# Patient Record
Sex: MALE | Race: BLACK OR AFRICAN AMERICAN | NOT HISPANIC OR LATINO | Employment: UNEMPLOYED | URBAN - METROPOLITAN AREA
[De-identification: names, ages, dates, MRNs, and addresses within clinical notes are randomized per-mention and may not be internally consistent; named-entity substitution may affect disease eponyms.]

---

## 2019-01-28 ENCOUNTER — HOSPITAL ENCOUNTER (OUTPATIENT)
Dept: INTENSIVE CARE | Facility: HOSPITAL | Age: 59
End: 2019-01-29
Attending: INTERNAL MEDICINE | Admitting: INTERNAL MEDICINE

## 2019-01-28 LAB
ABS NEUT (OLG): 5.17 X10(3)/MCL (ref 2.1–9.2)
ALBUMIN SERPL-MCNC: 3.6 GM/DL (ref 3.4–5)
ALBUMIN/GLOB SERPL: 0.9 RATIO (ref 1.1–2)
ALP SERPL-CCNC: 90 UNIT/L (ref 50–136)
ALT SERPL-CCNC: 16 UNIT/L (ref 12–78)
ANION GAP SERPL CALC-SCNC: 16 MMOL/L
APTT PPP: 24.5 SECOND(S) (ref 24.8–36.9)
AST SERPL-CCNC: 31 UNIT/L (ref 15–37)
BASOPHILS # BLD AUTO: 0 X10(3)/MCL (ref 0–0.2)
BASOPHILS NFR BLD AUTO: 0 %
BILIRUB SERPL-MCNC: 0.2 MG/DL (ref 0.2–1)
BILIRUBIN DIRECT+TOT PNL SERPL-MCNC: 0.1 MG/DL (ref 0–0.5)
BILIRUBIN DIRECT+TOT PNL SERPL-MCNC: 0.1 MG/DL (ref 0–0.8)
BUN SERPL-MCNC: 11 MG/DL (ref 7–18)
BUN SERPL-MCNC: 11 MG/DL (ref 8–26)
CALCIUM SERPL-MCNC: 8.7 MG/DL (ref 8.5–10.1)
CHLORIDE SERPL-SCNC: 103 MMOL/L (ref 98–109)
CHLORIDE SERPL-SCNC: 105 MMOL/L (ref 98–107)
CO2 SERPL-SCNC: 29 MMOL/L (ref 21–32)
CREAT SERPL-MCNC: 0.9 MG/DL (ref 0.6–1.3)
CREAT SERPL-MCNC: 1.12 MG/DL (ref 0.7–1.3)
EOSINOPHIL # BLD AUTO: 0.2 X10(3)/MCL (ref 0–0.9)
EOSINOPHIL NFR BLD AUTO: 2 %
ERYTHROCYTE [DISTWIDTH] IN BLOOD BY AUTOMATED COUNT: 14.5 % (ref 11.5–17)
GLOBULIN SER-MCNC: 3.8 GM/DL (ref 2.4–3.5)
GLUCOSE SERPL-MCNC: 121 MG/DL (ref 70–105)
GLUCOSE SERPL-MCNC: 124 MG/DL (ref 74–106)
HCT VFR BLD AUTO: 40.6 % (ref 42–52)
HCT VFR BLD CALC: 41 % (ref 38–51)
HGB BLD-MCNC: 12.8 GM/DL (ref 14–18)
HGB BLD-MCNC: 13.9 MG/DL (ref 12–17)
INR PPP: 1 (ref 0–1.3)
LYMPHOCYTES # BLD AUTO: 1.6 X10(3)/MCL (ref 0.6–4.6)
LYMPHOCYTES NFR BLD AUTO: 21 %
MCH RBC QN AUTO: 27.7 PG (ref 27–31)
MCHC RBC AUTO-ENTMCNC: 31.5 GM/DL (ref 33–36)
MCV RBC AUTO: 87.9 FL (ref 80–94)
MONOCYTES # BLD AUTO: 0.8 X10(3)/MCL (ref 0.1–1.3)
MONOCYTES NFR BLD AUTO: 11 %
NEUTROPHILS # BLD AUTO: 5.17 X10(3)/MCL (ref 2.1–9.2)
NEUTROPHILS NFR BLD AUTO: 66 %
PLATELET # BLD AUTO: 220 X10(3)/MCL (ref 130–400)
PMV BLD AUTO: 9.4 FL (ref 9.4–12.4)
POC IONIZED CALCIUM: 1.08 MMOL/L (ref 1.12–1.32)
POC TCO2: 28 MMOL/L (ref 24–29)
POC TROPONIN: 0.01 NG/ML (ref 0–0.08)
POC TROPONIN: 0.04 NG/ML (ref 0–0.08)
POTASSIUM BLD-SCNC: 3.4 MMOL/L (ref 3.5–4.9)
POTASSIUM SERPL-SCNC: 3.4 MMOL/L (ref 3.5–5.1)
PROT SERPL-MCNC: 7.4 GM/DL (ref 6.4–8.2)
PROTHROMBIN TIME: 13.3 SECOND(S) (ref 12.2–14.7)
RBC # BLD AUTO: 4.62 X10(6)/MCL (ref 4.7–6.1)
SODIUM BLD-SCNC: 142 MMOL/L (ref 138–146)
SODIUM SERPL-SCNC: 139 MMOL/L (ref 136–145)
WBC # SPEC AUTO: 7.9 X10(3)/MCL (ref 4.5–11.5)

## 2020-02-12 LAB — HEMOCCULT STL QL IA: POSITIVE

## 2020-03-31 ENCOUNTER — HISTORICAL (OUTPATIENT)
Dept: ADMINISTRATIVE | Facility: HOSPITAL | Age: 60
End: 2020-03-31

## 2020-03-31 LAB
ALBUMIN SERPL-MCNC: 4 G/DL (ref 3.8–4.8)
ALBUMIN/GLOB SERPL: 1.3 {RATIO} (ref 1.2–2.2)
ALP SERPL-CCNC: 123 IU/L (ref 39–117)
ALT SERPL-CCNC: 9 IU/L (ref 0–44)
AST SERPL-CCNC: 11 IU/L (ref 0–40)
BASOPHILS # BLD AUTO: 0.1 X10E3/UL (ref 0–0.2)
BASOPHILS NFR BLD AUTO: 1 %
BILIRUB SERPL-MCNC: 0.2 MG/DL (ref 0–1.2)
BILIRUB SERPL-MCNC: NEGATIVE MG/DL
BLOOD URINE, POC: NEGATIVE
BUN SERPL-MCNC: 7 MG/DL (ref 8–27)
CALCIUM SERPL-MCNC: 9.3 MG/DL (ref 8.6–10.2)
CHLORIDE SERPL-SCNC: 105 MMOL/L (ref 96–106)
CHOLEST SERPL-MCNC: 118 MG/DL (ref 100–199)
CHOLEST/HDLC SERPL: 2.6 RATIO (ref 0–5)
CLARITY, POC UA: CLEAR
CO2 SERPL-SCNC: 24 MMOL/L (ref 20–29)
COLOR, POC UA: YELLOW
CREAT SERPL-MCNC: 1.24 MG/DL (ref 0.76–1.27)
DEPRECATED CALCIDIOL+CALCIFEROL SERPL-MC: 10.3 NG/ML (ref 30–100)
EOSINOPHIL # BLD AUTO: 0.1 X10E3/UL (ref 0–0.4)
EOSINOPHIL NFR BLD AUTO: 1 %
ERYTHROCYTE [DISTWIDTH] IN BLOOD BY AUTOMATED COUNT: 18.3 % (ref 11.6–15.4)
GLOBULIN SER-MCNC: 3 G/DL (ref 1.5–4.5)
GLUCOSE UR QL STRIP: NEGATIVE
HCT VFR BLD AUTO: 41.2 % (ref 37.5–51)
HDLC SERPL-MCNC: 45 MG/DL
HGB BLD-MCNC: 12.2 G/DL (ref 13–17.7)
KETONES UR QL STRIP: NEGATIVE
LDLC SERPL CALC-MCNC: 58 MG/DL (ref 0–99)
LEUKOCYTE EST, POC UA: NEGATIVE
LYMPHOCYTES # BLD AUTO: 1.4 X10E3/UL (ref 0.7–3.1)
LYMPHOCYTES NFR BLD AUTO: 17 %
MCH RBC QN AUTO: 24.3 PG (ref 26.6–33)
MCHC RBC AUTO-ENTMCNC: 29.6 G/DL (ref 31.5–35.7)
MCV RBC AUTO: 82 FL (ref 79–97)
MONOCYTES # BLD AUTO: 0.5 X10E3/UL (ref 0.1–0.9)
MONOCYTES NFR BLD AUTO: 6 %
NEUTROPHILS # BLD AUTO: 6.5 X10E3/UL (ref 1.4–7)
NEUTROPHILS NFR BLD AUTO: 75 %
NITRITE, POC UA: NEGATIVE
PH, POC UA: 6
PLATELET # BLD AUTO: 322 X10E3/UL (ref 150–450)
POTASSIUM SERPL-SCNC: 3.9 MMOL/L (ref 3.5–5.2)
PROT SERPL-MCNC: 7 G/DL (ref 6–8.5)
PROTEIN, POC: NEGATIVE
RBC # BLD AUTO: 5.02 X10(6)/MCL (ref 4.14–5.8)
SODIUM SERPL-SCNC: 144 MMOL/L (ref 134–144)
SPECIFIC GRAVITY, POC UA: NORMAL
TRIGL SERPL-MCNC: 77 MG/DL (ref 0–149)
UROBILINOGEN, POC UA: NORMAL
VIT B12 SERPL-MCNC: 624 PG/ML (ref 232–1245)
VLDLC SERPL CALC-MCNC: 15 MG/DL (ref 5–40)
WBC # SPEC AUTO: 8.6 X10E3/UL (ref 3.4–10.8)

## 2020-09-08 ENCOUNTER — HISTORICAL (OUTPATIENT)
Dept: ADMINISTRATIVE | Facility: HOSPITAL | Age: 60
End: 2020-09-08

## 2020-09-08 LAB
ABS NEUT (OLG): 7.2 X10(3)/MCL (ref 2.1–9.2)
ALBUMIN SERPL-MCNC: 3.9 GM/DL (ref 3.4–5)
ALBUMIN/GLOB SERPL: 0.8 RATIO (ref 1.1–2)
ALP SERPL-CCNC: 112 UNIT/L (ref 45–117)
ALT SERPL-CCNC: 34 UNIT/L (ref 12–78)
AMYLASE SERPL-CCNC: 79 UNIT/L (ref 25–115)
APPEARANCE, UA: CLEAR
AST SERPL-CCNC: 26 UNIT/L (ref 15–37)
BACTERIA #/AREA URNS AUTO: ABNORMAL /HPF
BASOPHILS # BLD AUTO: 0.1 X10(3)/MCL (ref 0–0.2)
BASOPHILS NFR BLD AUTO: 1 %
BILIRUB SERPL-MCNC: 0.3 MG/DL (ref 0.2–1)
BILIRUB SERPL-MCNC: NEGATIVE MG/DL
BILIRUB UR QL STRIP: NEGATIVE
BILIRUBIN DIRECT+TOT PNL SERPL-MCNC: 0.1 MG/DL (ref 0–0.2)
BILIRUBIN DIRECT+TOT PNL SERPL-MCNC: 0.2 MG/DL
BLOOD URINE, POC: NORMAL
BUN SERPL-MCNC: 8 MG/DL (ref 7–18)
CALCIUM SERPL-MCNC: 9.1 MG/DL (ref 8.5–10.1)
CHLORIDE SERPL-SCNC: 104 MMOL/L (ref 98–107)
CLARITY, POC UA: CLEAR
CO2 SERPL-SCNC: 23 MMOL/L (ref 21–32)
COLOR UR: NORMAL
COLOR, POC UA: YELLOW
CREAT SERPL-MCNC: 1.2 MG/DL (ref 0.6–1.3)
EOSINOPHIL # BLD AUTO: 0 X10(3)/MCL (ref 0–0.9)
EOSINOPHIL NFR BLD AUTO: 0 %
ERYTHROCYTE [DISTWIDTH] IN BLOOD BY AUTOMATED COUNT: 14.6 % (ref 11.5–14.5)
GLOBULIN SER-MCNC: 4.6 GM/ML (ref 2.3–3.5)
GLUCOSE (UA): NEGATIVE
GLUCOSE SERPL-MCNC: 111 MG/DL (ref 74–106)
GLUCOSE UR QL STRIP: NEGATIVE
HCT VFR BLD AUTO: 44.7 % (ref 40–51)
HGB BLD-MCNC: 14.6 GM/DL (ref 13.5–17.5)
HGB UR QL STRIP: NEGATIVE
HYALINE CASTS #/AREA URNS LPF: ABNORMAL /LPF
IMM GRANULOCYTES # BLD AUTO: 0.02 10*3/UL
IMM GRANULOCYTES NFR BLD AUTO: 0 %
KETONES UR QL STRIP: NEGATIVE
KETONES UR QL STRIP: NEGATIVE
LEUKOCYTE EST, POC UA: NEGATIVE
LEUKOCYTE ESTERASE UR QL STRIP: NEGATIVE
LYMPHOCYTES # BLD AUTO: 1.6 X10(3)/MCL (ref 0.6–4.6)
LYMPHOCYTES NFR BLD AUTO: 17 %
MCH RBC QN AUTO: 28.4 PG (ref 26–34)
MCHC RBC AUTO-ENTMCNC: 32.7 GM/DL (ref 31–37)
MCV RBC AUTO: 87 FL (ref 80–100)
MONOCYTES # BLD AUTO: 0.8 X10(3)/MCL (ref 0.1–1.3)
MONOCYTES NFR BLD AUTO: 8 %
NEUTROPHILS # BLD AUTO: 7.2 X10(3)/MCL (ref 2.1–9.2)
NEUTROPHILS NFR BLD AUTO: 74 %
NITRITE UR QL STRIP: NEGATIVE
NITRITE, POC UA: NEGATIVE
PH UR STRIP: 5.5 [PH] (ref 4.5–8)
PH, POC UA: 6
PLATELET # BLD AUTO: 262 X10(3)/MCL (ref 130–400)
PMV BLD AUTO: 9.5 FL (ref 7.4–10.4)
POTASSIUM SERPL-SCNC: 3.4 MMOL/L (ref 3.5–5.1)
PROT SERPL-MCNC: 8.5 GM/DL (ref 6.4–8.2)
PROT UR QL STRIP: NEGATIVE
PROTEIN, POC: NEGATIVE
RBC # BLD AUTO: 5.14 X10(6)/MCL (ref 4.5–5.9)
RBC #/AREA URNS AUTO: ABNORMAL /HPF
SODIUM SERPL-SCNC: 135 MMOL/L (ref 136–145)
SP GR UR STRIP: 1.01 (ref 1–1.03)
SPECIFIC GRAVITY, POC UA: 1.01
SQUAMOUS #/AREA URNS LPF: ABNORMAL /LPF
UROBILINOGEN UR STRIP-ACNC: NORMAL
UROBILINOGEN, POC UA: NORMAL
WBC # SPEC AUTO: 9.7 X10(3)/MCL (ref 4.5–11)
WBC #/AREA URNS AUTO: ABNORMAL /HPF

## 2020-09-10 LAB — FINAL CULTURE: NO GROWTH

## 2020-11-06 ENCOUNTER — HISTORICAL (OUTPATIENT)
Dept: ADMINISTRATIVE | Facility: HOSPITAL | Age: 60
End: 2020-11-06

## 2020-11-06 LAB
ALBUMIN SERPL-MCNC: 3.9 G/DL (ref 3.8–4.8)
ALBUMIN/GLOB SERPL: 1.4 {RATIO} (ref 1.2–2.2)
ALP SERPL-CCNC: 98 IU/L (ref 39–117)
ALT SERPL-CCNC: 20 IU/L (ref 0–44)
AST SERPL-CCNC: 19 IU/L (ref 0–40)
BASOPHILS # BLD AUTO: 0 X10E3/UL (ref 0–0.2)
BASOPHILS NFR BLD AUTO: 0 %
BILIRUB SERPL-MCNC: 0.3 MG/DL (ref 0–1.2)
BUN SERPL-MCNC: 11 MG/DL (ref 8–27)
CALCIUM SERPL-MCNC: 9.3 MG/DL (ref 8.6–10.2)
CHLORIDE SERPL-SCNC: 101 MMOL/L (ref 96–106)
CHOLEST SERPL-MCNC: 116 MG/DL (ref 100–199)
CHOLEST/HDLC SERPL: 2.6 RATIO (ref 0–5)
CO2 SERPL-SCNC: 25 MMOL/L (ref 20–29)
CREAT SERPL-MCNC: 1.48 MG/DL (ref 0.76–1.27)
CREAT/UREA NIT SERPL: 7 (ref 10–24)
DEPRECATED CALCIDIOL+CALCIFEROL SERPL-MC: 36.4 NG/ML (ref 30–100)
EOSINOPHIL # BLD AUTO: 0.3 X10E3/UL (ref 0–0.4)
EOSINOPHIL NFR BLD AUTO: 4 %
ERYTHROCYTE [DISTWIDTH] IN BLOOD BY AUTOMATED COUNT: 14.9 % (ref 11.6–15.4)
FERRITIN SERPL-MCNC: 86 NG/ML (ref 30–400)
FOLATE SERPL-MCNC: 3.6 NG/ML
GLOBULIN SER-MCNC: 2.7 G/DL (ref 1.5–4.5)
GLUCOSE SERPL-MCNC: 102 MG/DL (ref 65–99)
HCT VFR BLD AUTO: 46.3 % (ref 37.5–51)
HDLC SERPL-MCNC: 45 MG/DL
HGB BLD-MCNC: 13.9 G/DL (ref 13–17.7)
IRON SATN MFR SERPL: 24 % (ref 15–55)
IRON SERPL-MCNC: 76 MCG/DL (ref 38–169)
LDLC SERPL CALC-MCNC: 55 MG/DL (ref 0–99)
LYMPHOCYTES # BLD AUTO: 1.8 X10E3/UL (ref 0.7–3.1)
LYMPHOCYTES NFR BLD AUTO: 24 %
MCH RBC QN AUTO: 28.1 PG (ref 26.6–33)
MCHC RBC AUTO-ENTMCNC: 30 G/DL (ref 31.5–35.7)
MCV RBC AUTO: 94 FL (ref 79–97)
MONOCYTES # BLD AUTO: 0.6 X10E3/UL (ref 0.1–0.9)
MONOCYTES NFR BLD AUTO: 8 %
NEUTROPHILS # BLD AUTO: 4.7 X10E3/UL (ref 1.4–7)
NEUTROPHILS NFR BLD AUTO: 64 %
PLATELET # BLD AUTO: 228 X10E3/UL (ref 150–450)
POTASSIUM SERPL-SCNC: 3.7 MMOL/L (ref 3.5–5.2)
PROT SERPL-MCNC: 6.6 G/DL (ref 6–8.5)
RBC # BLD AUTO: 4.95 X10(6)/MCL (ref 4.14–5.8)
SODIUM SERPL-SCNC: 141 MMOL/L (ref 134–144)
TIBC SERPL-MCNC: 318 MCG/DL (ref 250–450)
TRIGL SERPL-MCNC: 82 MG/DL (ref 0–149)
TSH SERPL-ACNC: 1.29 MIU/ML (ref 0.45–4.5)
VLDLC SERPL CALC-MCNC: 16 MG/DL (ref 5–40)
WBC # SPEC AUTO: 7.5 X10E3/UL (ref 3.4–10.8)

## 2021-02-01 ENCOUNTER — HISTORICAL (OUTPATIENT)
Dept: ADMINISTRATIVE | Facility: HOSPITAL | Age: 61
End: 2021-02-01

## 2021-02-01 LAB
ABS NEUT (OLG): 6.19 X10(3)/MCL (ref 2.1–9.2)
ALBUMIN SERPL-MCNC: 3.8 GM/DL (ref 3.4–4.8)
ALBUMIN/GLOB SERPL: 0.9 RATIO (ref 1.1–2)
ALP SERPL-CCNC: 105 UNIT/L (ref 40–150)
ALT SERPL-CCNC: 14 UNIT/L (ref 0–55)
APPEARANCE, UA: CLEAR
AST SERPL-CCNC: 13 UNIT/L (ref 5–34)
BACTERIA #/AREA URNS AUTO: ABNORMAL /HPF
BASOPHILS # BLD AUTO: 0 X10(3)/MCL (ref 0–0.2)
BASOPHILS NFR BLD AUTO: 1 %
BILIRUB SERPL-MCNC: 0.4 MG/DL
BILIRUB UR QL STRIP: NEGATIVE
BILIRUBIN DIRECT+TOT PNL SERPL-MCNC: 0.2 MG/DL (ref 0–0.5)
BILIRUBIN DIRECT+TOT PNL SERPL-MCNC: 0.2 MG/DL (ref 0–0.8)
BNP BLD-MCNC: <10 PG/ML
BUN SERPL-MCNC: 8 MG/DL (ref 8.4–25.7)
CALCIUM SERPL-MCNC: 9.5 MG/DL (ref 8.8–10)
CHLORIDE SERPL-SCNC: 101 MMOL/L (ref 98–107)
CHOLEST SERPL-MCNC: 135 MG/DL
CHOLEST/HDLC SERPL: 3 {RATIO} (ref 0–5)
CO2 SERPL-SCNC: 30 MMOL/L (ref 23–31)
COLOR UR: YELLOW
CREAT SERPL-MCNC: 1.12 MG/DL (ref 0.73–1.18)
CREAT UR-MCNC: 169.2 MG/DL (ref 58–161)
DEPRECATED CALCIDIOL+CALCIFEROL SERPL-MC: 42.9 NG/ML (ref 30–80)
EOSINOPHIL # BLD AUTO: 0.1 X10(3)/MCL (ref 0–0.9)
EOSINOPHIL NFR BLD AUTO: 2 %
ERYTHROCYTE [DISTWIDTH] IN BLOOD BY AUTOMATED COUNT: 14.5 % (ref 11.5–14.5)
GLOBULIN SER-MCNC: 4.2 GM/DL (ref 2.4–3.5)
GLUCOSE (UA): NEGATIVE
GLUCOSE SERPL-MCNC: 106 MG/DL (ref 82–115)
HCT VFR BLD AUTO: 46.1 % (ref 40–51)
HDLC SERPL-MCNC: 42 MG/DL (ref 35–60)
HGB BLD-MCNC: 14.6 GM/DL (ref 13.5–17.5)
HGB UR QL STRIP: NEGATIVE
HYALINE CASTS #/AREA URNS LPF: ABNORMAL /LPF
IMM GRANULOCYTES # BLD AUTO: 0.02 10*3/UL
IMM GRANULOCYTES NFR BLD AUTO: 0 %
KETONES UR QL STRIP: NEGATIVE
LDLC SERPL CALC-MCNC: 72 MG/DL (ref 50–140)
LEUKOCYTE ESTERASE UR QL STRIP: NEGATIVE
LYMPHOCYTES # BLD AUTO: 1.5 X10(3)/MCL (ref 0.6–4.6)
LYMPHOCYTES NFR BLD AUTO: 17 %
MCH RBC QN AUTO: 28.9 PG (ref 26–34)
MCHC RBC AUTO-ENTMCNC: 31.7 GM/DL (ref 31–37)
MCV RBC AUTO: 91.1 FL (ref 80–100)
MICROALBUMIN UR-MCNC: 9 UG/ML
MICROALBUMIN/CREAT RATIO PNL UR: 5.3 MG/GM CR (ref 0–30)
MONOCYTES # BLD AUTO: 0.8 X10(3)/MCL (ref 0.1–1.3)
MONOCYTES NFR BLD AUTO: 10 %
NEUTROPHILS # BLD AUTO: 6.19 X10(3)/MCL (ref 2.1–9.2)
NEUTROPHILS NFR BLD AUTO: 71 %
NITRITE UR QL STRIP: NEGATIVE
PH UR STRIP: 7 [PH] (ref 4.5–8)
PLATELET # BLD AUTO: 258 X10(3)/MCL (ref 130–400)
PMV BLD AUTO: 9.7 FL (ref 7.4–10.4)
POTASSIUM SERPL-SCNC: 3.9 MMOL/L (ref 3.5–5.1)
PROT SERPL-MCNC: 8 GM/DL (ref 5.8–7.6)
PROT UR QL STRIP: NEGATIVE
RBC # BLD AUTO: 5.06 X10(6)/MCL (ref 4.5–5.9)
RBC #/AREA URNS AUTO: ABNORMAL /HPF
SODIUM SERPL-SCNC: 141 MMOL/L (ref 136–145)
SP GR UR STRIP: 1.02 (ref 1–1.03)
SQUAMOUS #/AREA URNS LPF: ABNORMAL /LPF
T4 FREE SERPL-MCNC: 0.84 NG/DL (ref 0.7–1.48)
TRIGL SERPL-MCNC: 106 MG/DL (ref 34–140)
TSH SERPL-ACNC: 1.69 UIU/ML (ref 0.35–4.94)
UROBILINOGEN UR STRIP-ACNC: NORMAL
VIT B12 SERPL-MCNC: 1072 PG/ML (ref 213–816)
VLDLC SERPL CALC-MCNC: 21 MG/DL
WBC # SPEC AUTO: 8.7 X10(3)/MCL (ref 4.5–11)
WBC #/AREA URNS AUTO: ABNORMAL /HPF

## 2021-02-07 LAB
LEFT EYE DM RETINOPATHY: NEGATIVE
RIGHT EYE DM RETINOPATHY: NEGATIVE

## 2021-03-25 ENCOUNTER — HOSPITAL ENCOUNTER (OUTPATIENT)
Dept: MEDSURG UNIT | Facility: HOSPITAL | Age: 61
End: 2021-03-27
Attending: INTERNAL MEDICINE | Admitting: INTERNAL MEDICINE

## 2021-03-25 ENCOUNTER — HOSPITAL ENCOUNTER (OUTPATIENT)
Dept: TELEMEDICINE | Facility: HOSPITAL | Age: 61
Discharge: HOME | End: 2021-03-25
Payer: COMMERCIAL

## 2021-03-25 DIAGNOSIS — R56.9 SEIZURES: ICD-10-CM

## 2021-03-25 LAB
ABS NEUT (OLG): 6.05 X10(3)/MCL (ref 2.1–9.2)
AMPHET UR QL SCN: NEGATIVE
AMPHET UR QL SCN: NEGATIVE
ANION GAP SERPL CALC-SCNC: 12 MMOL/L
APPEARANCE, UA: CLEAR
APTT PPP: 26.4 SECOND(S) (ref 23.3–37)
BACTERIA #/AREA URNS AUTO: ABNORMAL /HPF
BARBITURATE SCN PRESENT UR: NEGATIVE
BARBITURATE SCN PRESENT UR: NEGATIVE
BASOPHILS # BLD AUTO: 0 X10(3)/MCL (ref 0–0.2)
BASOPHILS NFR BLD AUTO: 0 %
BENZODIAZ UR QL SCN: NEGATIVE
BENZODIAZ UR QL SCN: NEGATIVE
BILIRUB UR QL STRIP: NEGATIVE
BUN SERPL-MCNC: 6 MG/DL (ref 8–26)
CANNABINOIDS UR QL SCN: NEGATIVE
CANNABINOIDS UR QL SCN: NEGATIVE
CHLORIDE SERPL-SCNC: 96 MMOL/L (ref 98–109)
COCAINE UR QL SCN: NEGATIVE
COCAINE UR QL SCN: NEGATIVE
COLOR UR: ABNORMAL
CREAT SERPL-MCNC: 1.2 MG/DL (ref 0.6–1.3)
EOSINOPHIL # BLD AUTO: 0.2 X10(3)/MCL (ref 0–0.9)
EOSINOPHIL NFR BLD AUTO: 2 %
ERYTHROCYTE [DISTWIDTH] IN BLOOD BY AUTOMATED COUNT: 13.6 % (ref 11.5–14.5)
GLUCOSE (UA): NEGATIVE
GLUCOSE SERPL-MCNC: 91 MG/DL (ref 70–105)
HCT VFR BLD AUTO: 41.1 % (ref 40–51)
HCT VFR BLD CALC: 42 % (ref 38–51)
HGB BLD-MCNC: 13.1 GM/DL (ref 13.5–17.5)
HGB BLD-MCNC: 14.3 MG/DL (ref 12–17)
HGB UR QL STRIP: NEGATIVE
HYALINE CASTS #/AREA URNS LPF: ABNORMAL /LPF
IMM GRANULOCYTES # BLD AUTO: 0.03 10*3/UL
IMM GRANULOCYTES NFR BLD AUTO: 0 %
KETONES UR QL STRIP: NEGATIVE
LEUKOCYTE ESTERASE UR QL STRIP: NEGATIVE
LYMPHOCYTES # BLD AUTO: 1.5 X10(3)/MCL (ref 0.6–4.6)
LYMPHOCYTES NFR BLD AUTO: 18 %
MCH RBC QN AUTO: 29 PG (ref 26–34)
MCHC RBC AUTO-ENTMCNC: 31.9 GM/DL (ref 31–37)
MCV RBC AUTO: 90.9 FL (ref 80–100)
MONOCYTES # BLD AUTO: 0.7 X10(3)/MCL (ref 0.1–1.3)
MONOCYTES NFR BLD AUTO: 8 %
NEUTROPHILS # BLD AUTO: 6.05 X10(3)/MCL (ref 2.1–9.2)
NEUTROPHILS NFR BLD AUTO: 71 %
NITRITE UR QL STRIP: NEGATIVE
OPIATES UR QL SCN: NEGATIVE
OPIATES UR QL SCN: NEGATIVE
PCP UR QL: NEGATIVE
PCP UR QL: NEGATIVE
PH UR STRIP.AUTO: 6.5 [PH] (ref 3–11)
PH UR STRIP.AUTO: 6.5 [PH] (ref 3–11)
PH UR STRIP: 7 [PH] (ref 4.5–8)
PLATELET # BLD AUTO: 228 X10(3)/MCL (ref 130–400)
PMV BLD AUTO: 9.5 FL (ref 7.4–10.4)
POC IONIZED CALCIUM: 1.11 MMOL/L (ref 1.12–1.32)
POC TCO2: 34 MMOL/L (ref 24–29)
POC TROPONIN: 0 NG/ML (ref 0–0.08)
POTASSIUM BLD-SCNC: 3.4 MMOL/L (ref 3.5–4.9)
PROT UR QL STRIP: 20 MG/DL
RBC # BLD AUTO: 4.52 X10(6)/MCL (ref 4.5–5.9)
RBC #/AREA URNS AUTO: ABNORMAL /HPF
SARS-COV-2 AG RESP QL IA.RAPID: NEGATIVE
SODIUM BLD-SCNC: 138 MMOL/L (ref 138–146)
SP GR UR STRIP: 1.01 (ref 1–1.03)
SQUAMOUS #/AREA URNS LPF: ABNORMAL /LPF
TROPONIN I SERPL-MCNC: <0.01 NG/ML (ref 0–0.04)
UROBILINOGEN UR STRIP-ACNC: NORMAL
WBC # SPEC AUTO: 8.6 X10(3)/MCL (ref 4.5–11)
WBC #/AREA URNS AUTO: ABNORMAL /HPF

## 2021-03-25 PROCEDURE — G0427 INPT/ED TELECONSULT70: HCPCS | Mod: GT,,, | Performed by: PSYCHIATRY & NEUROLOGY

## 2021-03-25 PROCEDURE — G0427 PR INPT TELEHEALTH CON 70/>M: ICD-10-PCS | Mod: GT,,, | Performed by: PSYCHIATRY & NEUROLOGY

## 2021-03-26 LAB
ABS NEUT (OLG): 5.94 X10(3)/MCL (ref 2.1–9.2)
ALBUMIN SERPL-MCNC: 3.2 GM/DL (ref 3.4–4.8)
ALBUMIN/GLOB SERPL: 0.8 RATIO (ref 1.1–2)
ALP SERPL-CCNC: 108 UNIT/L (ref 40–150)
ALT SERPL-CCNC: 9 UNIT/L (ref 0–55)
AST SERPL-CCNC: 11 UNIT/L (ref 5–34)
BASOPHILS # BLD AUTO: 0 X10(3)/MCL (ref 0–0.2)
BASOPHILS NFR BLD AUTO: 0 %
BILIRUB SERPL-MCNC: 0.6 MG/DL
BILIRUBIN DIRECT+TOT PNL SERPL-MCNC: 0.2 MG/DL (ref 0–0.5)
BILIRUBIN DIRECT+TOT PNL SERPL-MCNC: 0.4 MG/DL (ref 0–0.8)
BUN SERPL-MCNC: 8.2 MG/DL (ref 8.4–25.7)
CALCIUM SERPL-MCNC: 8.8 MG/DL (ref 8.8–10)
CHLORIDE SERPL-SCNC: 101 MMOL/L (ref 98–107)
CO2 SERPL-SCNC: 27 MMOL/L (ref 23–31)
CREAT SERPL-MCNC: 1.07 MG/DL (ref 0.73–1.18)
EOSINOPHIL # BLD AUTO: 0.2 X10(3)/MCL (ref 0–0.9)
EOSINOPHIL NFR BLD AUTO: 2 %
ERYTHROCYTE [DISTWIDTH] IN BLOOD BY AUTOMATED COUNT: 13.5 % (ref 11.5–14.5)
GLOBULIN SER-MCNC: 3.8 GM/DL (ref 2.4–3.5)
GLUCOSE SERPL-MCNC: 108 MG/DL (ref 82–115)
HCT VFR BLD AUTO: 43.5 % (ref 40–51)
HGB BLD-MCNC: 13.7 GM/DL (ref 13.5–17.5)
IMM GRANULOCYTES # BLD AUTO: 0.02 10*3/UL
IMM GRANULOCYTES NFR BLD AUTO: 0 %
INR PPP: 1.03 (ref 0.9–1.2)
LYMPHOCYTES # BLD AUTO: 1.1 X10(3)/MCL (ref 0.6–4.6)
LYMPHOCYTES NFR BLD AUTO: 14 %
MCH RBC QN AUTO: 28.4 PG (ref 26–34)
MCHC RBC AUTO-ENTMCNC: 31.5 GM/DL (ref 31–37)
MCV RBC AUTO: 90.1 FL (ref 80–100)
MONOCYTES # BLD AUTO: 0.6 X10(3)/MCL (ref 0.1–1.3)
MONOCYTES NFR BLD AUTO: 8 %
NEUTROPHILS # BLD AUTO: 5.94 X10(3)/MCL (ref 2.1–9.2)
NEUTROPHILS NFR BLD AUTO: 75 %
PLATELET # BLD AUTO: 211 X10(3)/MCL (ref 130–400)
PMV BLD AUTO: 9.4 FL (ref 7.4–10.4)
POTASSIUM SERPL-SCNC: 3.8 MMOL/L (ref 3.5–5.1)
PROT SERPL-MCNC: 7 GM/DL (ref 5.8–7.6)
PROTHROMBIN TIME: 13.1 SECOND(S) (ref 11.9–14.4)
RBC # BLD AUTO: 4.83 X10(6)/MCL (ref 4.5–5.9)
SODIUM SERPL-SCNC: 139 MMOL/L (ref 136–145)
WBC # SPEC AUTO: 7.9 X10(3)/MCL (ref 4.5–11)

## 2021-03-27 LAB
ABS NEUT (OLG): 4.89 X10(3)/MCL (ref 2.1–9.2)
ALBUMIN SERPL-MCNC: 3 GM/DL (ref 3.4–4.8)
ALBUMIN/GLOB SERPL: 0.8 RATIO (ref 1.1–2)
ALP SERPL-CCNC: 98 UNIT/L (ref 40–150)
ALT SERPL-CCNC: 11 UNIT/L (ref 0–55)
AST SERPL-CCNC: 11 UNIT/L (ref 5–34)
BASOPHILS # BLD AUTO: 0 X10(3)/MCL (ref 0–0.2)
BASOPHILS NFR BLD AUTO: 0 %
BILIRUB SERPL-MCNC: 0.5 MG/DL
BILIRUBIN DIRECT+TOT PNL SERPL-MCNC: 0.2 MG/DL (ref 0–0.5)
BILIRUBIN DIRECT+TOT PNL SERPL-MCNC: 0.3 MG/DL (ref 0–0.8)
BUN SERPL-MCNC: 6.7 MG/DL (ref 8.4–25.7)
CALCIUM SERPL-MCNC: 8.6 MG/DL (ref 8.8–10)
CHLORIDE SERPL-SCNC: 101 MMOL/L (ref 98–107)
CO2 SERPL-SCNC: 31 MMOL/L (ref 23–31)
CREAT SERPL-MCNC: 1.04 MG/DL (ref 0.73–1.18)
EOSINOPHIL # BLD AUTO: 0.2 X10(3)/MCL (ref 0–0.9)
EOSINOPHIL NFR BLD AUTO: 3 %
ERYTHROCYTE [DISTWIDTH] IN BLOOD BY AUTOMATED COUNT: 13.3 % (ref 11.5–14.5)
GLOBULIN SER-MCNC: 3.7 GM/DL (ref 2.4–3.5)
GLUCOSE SERPL-MCNC: 112 MG/DL (ref 82–115)
HCT VFR BLD AUTO: 43.5 % (ref 40–51)
HGB BLD-MCNC: 13.7 GM/DL (ref 13.5–17.5)
IMM GRANULOCYTES # BLD AUTO: 0.02 10*3/UL
IMM GRANULOCYTES NFR BLD AUTO: 0 %
INR PPP: 1.11 (ref 0.9–1.2)
LYMPHOCYTES # BLD AUTO: 1.3 X10(3)/MCL (ref 0.6–4.6)
LYMPHOCYTES NFR BLD AUTO: 19 %
MCH RBC QN AUTO: 28.4 PG (ref 26–34)
MCHC RBC AUTO-ENTMCNC: 31.5 GM/DL (ref 31–37)
MCV RBC AUTO: 90.1 FL (ref 80–100)
MONOCYTES # BLD AUTO: 0.6 X10(3)/MCL (ref 0.1–1.3)
MONOCYTES NFR BLD AUTO: 9 %
NEUTROPHILS # BLD AUTO: 4.89 X10(3)/MCL (ref 2.1–9.2)
NEUTROPHILS NFR BLD AUTO: 69 %
PLATELET # BLD AUTO: 213 X10(3)/MCL (ref 130–400)
PMV BLD AUTO: 9.4 FL (ref 7.4–10.4)
POTASSIUM SERPL-SCNC: 3.5 MMOL/L (ref 3.5–5.1)
PROT SERPL-MCNC: 6.7 GM/DL (ref 5.8–7.6)
PROTHROMBIN TIME: 13.9 SECOND(S) (ref 11.9–14.4)
RBC # BLD AUTO: 4.83 X10(6)/MCL (ref 4.5–5.9)
SODIUM SERPL-SCNC: 140 MMOL/L (ref 136–145)
WBC # SPEC AUTO: 7.1 X10(3)/MCL (ref 4.5–11)

## 2021-03-29 PROBLEM — R56.9 SEIZURES: Status: ACTIVE | Noted: 2021-03-29

## 2021-05-14 ENCOUNTER — HISTORICAL (OUTPATIENT)
Dept: ADMINISTRATIVE | Facility: HOSPITAL | Age: 61
End: 2021-05-14

## 2021-05-14 LAB
ABS NEUT (OLG): 5.33 X10(3)/MCL (ref 2.1–9.2)
ALBUMIN SERPL-MCNC: 3.8 GM/DL (ref 3.4–4.8)
ALBUMIN/GLOB SERPL: 0.9 RATIO (ref 1.1–2)
ALP SERPL-CCNC: 107 UNIT/L (ref 40–150)
ALT SERPL-CCNC: 18 UNIT/L (ref 0–55)
AST SERPL-CCNC: 15 UNIT/L (ref 5–34)
BASOPHILS # BLD AUTO: 0 X10(3)/MCL (ref 0–0.2)
BASOPHILS NFR BLD AUTO: 1 %
BILIRUB SERPL-MCNC: 0.4 MG/DL
BILIRUBIN DIRECT+TOT PNL SERPL-MCNC: 0.2 MG/DL (ref 0–0.5)
BILIRUBIN DIRECT+TOT PNL SERPL-MCNC: 0.2 MG/DL (ref 0–0.8)
BNP BLD-MCNC: 15.1 PG/ML
BUN SERPL-MCNC: 10.7 MG/DL (ref 8.4–25.7)
CALCIUM SERPL-MCNC: 9.7 MG/DL (ref 8.8–10)
CHLORIDE SERPL-SCNC: 99 MMOL/L (ref 98–107)
CO2 SERPL-SCNC: 29 MMOL/L (ref 23–31)
CREAT SERPL-MCNC: 1.47 MG/DL (ref 0.73–1.18)
EOSINOPHIL # BLD AUTO: 0.2 X10(3)/MCL (ref 0–0.9)
EOSINOPHIL NFR BLD AUTO: 2 %
ERYTHROCYTE [DISTWIDTH] IN BLOOD BY AUTOMATED COUNT: 14.2 % (ref 11.5–14.5)
EST. AVERAGE GLUCOSE BLD GHB EST-MCNC: 125.5 MG/DL
GLOBULIN SER-MCNC: 4.3 GM/DL (ref 2.4–3.5)
GLUCOSE SERPL-MCNC: 101 MG/DL (ref 82–115)
HBA1C MFR BLD: 6 %
HCT VFR BLD AUTO: 46.8 % (ref 40–51)
HGB BLD-MCNC: 14.7 GM/DL (ref 13.5–17.5)
IMM GRANULOCYTES # BLD AUTO: 0.03 10*3/UL
IMM GRANULOCYTES NFR BLD AUTO: 0 %
LYMPHOCYTES # BLD AUTO: 1.5 X10(3)/MCL (ref 0.6–4.6)
LYMPHOCYTES NFR BLD AUTO: 19 %
MCH RBC QN AUTO: 28.5 PG (ref 26–34)
MCHC RBC AUTO-ENTMCNC: 31.4 GM/DL (ref 31–37)
MCV RBC AUTO: 90.7 FL (ref 80–100)
MONOCYTES # BLD AUTO: 0.6 X10(3)/MCL (ref 0.1–1.3)
MONOCYTES NFR BLD AUTO: 8 %
NEUTROPHILS # BLD AUTO: 5.33 X10(3)/MCL (ref 2.1–9.2)
NEUTROPHILS NFR BLD AUTO: 70 %
PLATELET # BLD AUTO: 262 X10(3)/MCL (ref 130–400)
PMV BLD AUTO: 9.8 FL (ref 7.4–10.4)
POTASSIUM SERPL-SCNC: 3.9 MMOL/L (ref 3.5–5.1)
PROT SERPL-MCNC: 8.1 GM/DL (ref 5.8–7.6)
RBC # BLD AUTO: 5.16 X10(6)/MCL (ref 4.5–5.9)
SODIUM SERPL-SCNC: 142 MMOL/L (ref 136–145)
WBC # SPEC AUTO: 7.6 X10(3)/MCL (ref 4.5–11)

## 2021-10-04 LAB — EST CREAT CLEARANCE SER (OHS): 63.15 ML/MIN

## 2022-04-27 DIAGNOSIS — R56.9 SEIZURE: Primary | ICD-10-CM

## 2022-04-29 NOTE — ED PROVIDER NOTES
Patient:   Lawrence Osullivan             MRN: [id number]            FIN: [id number]               Age:   58 years     Sex:  Male     :  1960   Associated Diagnoses:   Accelerated hypertension; Chronic pain; Expressive aphasia; Right hemiparesis; Noncompliance with medications; Infestation by bed bug   Author:   Jazz Nicholson MD      Basic Information   Time seen: Date & time 2019 16:24:00, Immediately upon arrival.   History source: EMS.   Arrival mode: Ambulance.   History limitation: Clinical condition, Uncooperative patient.   Additional information: Chief Complaint from Nursing Triage Note : Chief Complaint   2019 16:24 CST      Chief Complaint           c/o AMS onset 3 hours ago. pt has hx of previous CVA. pt lethargic but follows commands. pt mildly tachypneic. code fast called per Bharat EJAN BAPTISTE.   .      History of Present Illness   The patient presents with altered mental status and 59 y/o AAM with a hx of CVA with residual aphasia and right upper and right lower extremity deficits presents to the ED via EMS due to altered mental status onset x3 hours PTA. Family (, patient's brother, Chan) states EMS was initially called for an headache and worsening slurred speech.  Patient's sister read By telephone reports that he has been out of his medications for several days and this happens when he is out of his medications.. Patient began c/o RUE pain at ED..  The onset was 3  hours ago.  The course/duration of symptoms is constant.  The character of symptoms is confused.  The degree at onset was moderate.  The degree at present is moderate.  Baseline status: alert and oriented X 4.   The exacerbating factor is none.  The relieving factor is none.  Risk factors consist of cerebral vascular accident.  Prior episodes: none.  Therapy today: emergency medical services.     PATIENT HAS ALTERNATE MRN [id number] WITH MULTIPLE PRIOR SIMILAR VISITS      Review of Systems             Additional  review of systems information: Unable to obtain due to: Uncooperative patient.      Health Status   Allergies:    Allergic Reactions (Selected)  Severity Not Documented  Aspirin- Unknown.  HydrALAZINE- Unknown.  Iodine- Unknown..   Medications:  (Selected)   Inpatient Medications  Ordered  IVF Normal Saline NS Bolus 250ml 250 mL: 250 mL, 250 mL, IV, 250 mL/hr, start date 01/28/19 16:27:00 CST, bolus dose: 250 mL  IVF Normal Saline NS Infusion 1,000 mL: 1,000 mL, 1,000 mL, IV, 75 mL/hr, start date 01/28/19 16:27:00 CST.      Past Medical/ Family/ Social History   Medical history:   CVA.   Social history:    Social & Psychosocial Habits    Alcohol  07/30/2018  Use: Never    Tobacco  07/30/2018  Use: Current every day smoker    Type: Cigarettes.   Surgical history.   Physical Examination   [place] coma scale:  Eye response: 4 /4, verbal response: 3 /5, motor response: 5 /6, Total score: Total score: 12.    Neurological:  Level of consciousness: Uncooperative, Cognitive function: Will not answer questions, Motor strength: Proximal right upper extremity  4 /5, distal right upper extremity  4 /5, proximal left upper extremity  5 /5, distal left upper extremity  5 /5, right lower extremity  4 /5, left lower extremity  5 /5, Coordination: Finger(s) to nose (will not cooperate with testing), heel(s) of foot to opposite shin (will not cooperate with testing), Speech: Aphasic, slurred, Gait: deferred.    General:  Alert, moderate distress, Wincing and grimacing.    Skin:  Warm, dry, intact, normal for ethnicity.    Head:  Normocephalic, atraumatic.    Neck:  Supple, trachea midline, no JVD.    Eye:  Pupils are equal, round and reactive to light, normal conjunctiva.    Ears, nose, mouth and throat:  Oral mucosa moist.   Cardiovascular:  Regular rate and rhythm, Normal peripheral perfusion.    Respiratory:  Lungs are clear to auscultation, respirations are non-labored.    Gastrointestinal:  Soft, Nontender, Non distended.        Medical Decision Making   Rationale:  Patient presented and activated as a code fast as he had right-sided deficits and a seizure and was markedly hypertensive complaining of a severe headache prior to arrival concerning for possible intercranial hemorrhage.  Head CT demonstrated extensive encephalomalacia on the left related to an old stroke but otherwise no acute abnormalities.  I reviewed the patient's records including records under an alternate MRN.  My documentation below essentially is the same presentation as his current visit.  I do not suspect an acute CVA event in this patient; however, his unregulated hypertension likely leads to a recrudescence of his symptoms.  She demonstrates that the patient has refilled his medications recently that apparently uses a medication delivery service and has not received in the mail.  We discussed the importance of having his refills submitted before his prior prescription runs out and his family verbalized understanding.    Patient will be admitted to Dr. Ledezma for overnight observation to ensure that his symptoms do not worsen and that his blood pressure remains controlled..   Documents reviewed:  Emergency department nurses' notes, emergency department records, prior records, Reviewed from my documentation from a 8/30/2018 encounter with a similar presentation    Time: 8/30/2018 11:45:00 .   Notes: Head CT demonstrated no acute abnormalities.  Patient's symptoms are somewhat improved at this time.  He is able to hold both arms off the bed without drift and bilateral lower extremities hold for 5 seconds about hitting the back though they do drift.  Patient is mostly complaining about headache at this time..   Time: 8/30/2018 11:55:00 .   Notes: Head CT demonstrated no acute intracranial processes, large left-sided stroke noted.  On further discussion with the patient's brother Chan, patient did have some residual speech deficits today as well as right upper and  right lower extremity weakness which is chronic for him.  He does not feel that his speech symptoms are any worse than baseline; however, the patient continues to insist that his speech is somewhat worse.  Patient still complaining of significant headache.  Pain medications administered.    I discussed with Dr. Vergara regarding the patient's presentation, states that if patient's verbal symptoms/expressive aphasia or truly worse than his baseline, this would be an indication for TPA.  I discussed the risks and benefits of TPA with the patient and his brother, they would like to discuss it further before proceeding..   Time: 8/30/2018 12:15:00 .   Notes: Patient's symptoms are improving with headache control.  I discussed with Dr. Vergara as above and offered potential for TPA administration.  Records review demonstrates that he did receive TPA in 2014 for a recrudescence of his symptoms at that time..   Time: 8/30/2018 12:35:00 .   Notes: Patient and his brother Chan are still discussing whether or not to proceed with TPA.  Chan would like to discuss with his sister who is the patient's primary caregiver.   Time: 8/30/2018 12:45:00 .   Notes: I was able to discuss with the patient's sister who is his primary caregiver who advised that the language deficits were seeing today are his baseline since his stroke in 2012.  She further elaborates that he frequently complains of pain in his arms and legs and that they recently stopped all of his pain medications because they felt he was having addiction issues.  We discussed at length that there is a possibility that his symptoms slightly worsened today which may reflect a new stroke or worsening of his prior stroke but after discussion of the potential risks of TPA administration, she states that she does not think his symptoms have worsened enough to accept these risks.  Certainly his uncontrolled blood pressure in the setting of being out of his medications could exacerbate  both his headaches, pain, and cause recrudescence of his stroke symptoms.  Patient will be admitted to his primary care physician's service for hypertensive urgency and headache..       .    Orders  Launch Order Profile (Selected)   Inpatient Orders  Ordered  Admission Assessment Adult: 19 19:08:03 CST, Stop date 19 19:08:03 CST  Admission History Adult: 19 19:08:03 CST, Stop date 19 19:08:03 CST  Admit to Outpatient Observation: 19 18:34:00 CST, Jose Ledezma MD Telemetry with Monitor, No  Basic Admission Information Adult: 19 19:08:03 CST, Stop date 19 19:08:03 CST  Blood Glucose Monitoring POC: 19 16:27:00 CST, Stop date 19 16:27:00 CST, Blood, Stat, 19 16:27:00 CST  Blood Pressure: 19 16:27:00 CST, Stop date 19 16:27:00 CST, monitor blood pressure.  Capacity Management Bed Request: 19 18:34:46 CST, Telemetry with Monitor  Cardiac Monitorin19 16:27:00 CST, Constant Order, place on telemetry monitor.  Discharge Planning Ongoing Assessment: 19 9:00:00 CST, q3day  Fall Risk Protocol: 19 17:28:40 CST, Constant Order  HOB flat.: 19 16:27:00 CST, HOB flat.  IVF Normal Saline NS Infusion 1,000 mL: 1,000 mL, 1,000 mL, IV, 75 mL/hr, start date 19 16:27:00 CST  If hemodynamically unstable (SBP<100  and pulse >110) bolus with 250ml NS and notify MD.: 19 16:27:00 CST, If hemodynamically unstable (SBP<100  and pulse >110) bolus with 250ml NS and notify MD.  If onset of symptoms < 6 hours and NIH Stroke Scale is 6 or greater consult interventional neurolog...: 19 16:27:00 CST, If onset of symptoms < 6 hours and NIH Stroke Scale is 6 or greater consult interventional neurology STAT, if available (see physician on call ER schedule) for possible endovascular therapy.  [facility] - Swallow Screening Tool: 19 16:27:00 CST, Constant order, Prior to oral medication administration.  Meal Supervision: 19  18:45:05 CST  NIH Stroke Scale Assessment: 01/28/19 16:27:00 CST, q1hr, abbreviated NIHSS., 01/28/19 16:30:00 CST  NPO: 01/28/19 16:27:00 CST, CM NPO  Neuro Checks: 01/28/19 18:43:00 CST, q4hr, 01/28/19 19:00:00 CST  Oxygen Therapy: 01/28/19 16:27:00 CST, Stat, Nasal Cannula, Maintain saturation of at least 94%., CM Oxygen  Pulse Oximetry: 01/28/19 16:27:00 CST, Stop date 01/28/19 16:27:00 CST, Monitor and maintain saturation of at least 95%.  Saline Lock Insert: 01/28/19 16:27:00 CST, Stop date 01/28/19 16:27:00 CST  Vital Signs: 01/28/19 16:27:00 CST, q1hr, with abbreviated NIHSS (or more frequently if indicated).  upon arrival.: 01/28/19 16:27:00 CST, upon arrival.  Ordered (Collected)  POC iSTAT ER Troponin request: BLOOD, STAT collect, Collected, 01/28/19 16:40:17 CST, Stop date 01/28/19 16:40:00 CST, Lab Collect, Print Label By Order Location  Completed  Automated Diff: STAT collect, 01/28/19 16:40:00 CST, Blood, Collected, Stop date 01/28/19 16:40:00 CST, Lab Collect, Print Label By Order Location, 01/28/19 16:27:00 CST  Bed Bug Identification: Stat collect, 01/28/19 17:17:00 CST, Other, Nurse collect, Stop date 01/28/19 17:19:00 CST, Print Label By Order Location  CBC w/ Auto Diff: STAT collect, 01/28/19 16:40:17 CST, BLOOD, Collected, Stop date 01/28/19 16:40:00 CST, Lab Collect  CMP: STAT collect, 01/28/19 16:40:17 CST, BLOOD, Collected, Stop date 01/28/19 16:40:00 CST, Lab Collect  CT Head W/O Contrast: Stat, 01/28/19 16:27:00 CST, Other (please specify), CODE FAST, None, Stretcher, Rad Type, Schedule this test, 01/28/19 16:27:00 CST  EKG Adult 12 Lead: 01/28/19 16:27:00 CST, Stat, Once, 01/28/19 16:27:00 CST, -1, -1, 01/28/19 16:27:00 CST  Estimated Glomerular Filtration Rate: STAT collect, 01/28/19 16:40:00 CST, Blood, Collected, Stop date 01/28/19 16:40:00 CST, Lab Collect, Print Label By Order Location, 01/28/19 16:27:00 CST  INR - Protime: STAT collect, 01/28/19 16:40:17 CST, BLOOD, Collected, Stop  date 01/28/19 16:40:00 CST, Lab Collect  IVF Normal Saline NS Bolus 250ml 250 mL: 250 mL, 250 mL, IV, 250 mL/hr, order duration: 1 dose(s), start date 01/28/19 16:27:00 CST, stop date 01/28/19 17:26:00 CST, bolus dose: 250 mL  POC CBG: Blood, Stat collect, Collected, 01/28/19 16:36:36 CST  POC ISTAT Chem8 Request:: BLOOD, STAT collect, Collected, 01/28/19 16:40:17 CST, Stop date 01/28/19 16:40:00 CST, Lab Collect, Print Label By Order Location  POC iSTAT PTINR: Blood, Stat collect, Collected, 01/28/19 16:39:57 CST  POC iStat PT/INR request: BLOOD, STAT collect, Collected, 01/28/19 16:40:17 CST, Stop date 01/28/19 16:40:00 CST, Lab Collect, Print Label By Order Location  PTT: STAT collect, 01/28/19 16:40:17 CST, BLOOD, Collected, Stop date 01/28/19 16:40:00 CST, Lab Collect  Point of Care iSTAT Chem8: Blood, Stat collect, Collected, 01/28/19 16:46:23 CST  labetalol: 10 mg, form: Soln, IV Push, AdHoc, first dose 01/28/19 17:11:00 CST, stop date 01/28/19 17:11:00 CST.   Electrocardiogram:  Time 1/28/2019 16:45:00, rate 75, normal sinus rhythm, no ectopy, normal TN & QRS intervals, EP Interp, STT segments Non specific changes, no STEMI.    Results review:  Lab results : Lab View   1/28/2019 17:20 CST      KOH Prep                  See Results  (Modified)   1/28/2019 16:46 CST      POC Sodium                142 mmol/L                             POC Potassium             3.4 mmol/L  LOW                             POC Chloride              103 mmol/L                             POC Ion Calcium           1.08 mmol/L  LOW                             POC Glucose               121 mg/dL  HI                             POC BUN                   11.0 mg/dL                             POC Creatinine            0.9 mg/dL                             POC AGAP                  16.0  NA                             POC Hb                    13.9 mg/dL                             POC Hct                   41.0 %                              POC TCO2                  28.0 mmol/L    1/28/2019 16:40 CST      Sodium Lvl                139 mmol/L                             Potassium Lvl             3.4 mmol/L  LOW                             Chloride                  105 mmol/L                             CO2                       29.0 mmol/L                             Calcium Lvl               8.7 mg/dL                             Glucose Lvl               124 mg/dL  HI                             BUN                       11.0 mg/dL                             Creatinine                1.12 mg/dL                             eGFR-AA                   >60 mL/min/1.73 m2  NA                             eGFR-EWA                  >60 mL/min/1.73 m2  NA                             Bili Total                0.2 mg/dL                             Bili Direct               0.10 mg/dL                             Bili Indirect             0.10 mg/dL                             AST                       31 unit/L                             ALT                       16 unit/L                             Alk Phos                  90 unit/L                             Total Protein             7.4 gm/dL                             Albumin Lvl               3.60 gm/dL                             Globulin                  3.80 gm/dL  HI                             A/G Ratio                 0.9 ratio  LOW                             PT                        13.3 second(s)                             INR                       1.0                             PTT                       24.5 second(s)  LOW                             WBC                       7.9 x10(3)/mcL                             RBC                       4.62 x10(6)/mcL  LOW                             Hgb                       12.8 gm/dL  LOW                             Hct                       40.6 %  LOW                             Platelet                  220 x10(3)/mcL                              MCV                       87.9 fL                             MCH                       27.7 pg                             MCHC                      31.5 gm/dL  LOW                             RDW                       14.5 %                             MPV                       9.4 fL                             Abs Neut                  5.17 x10(3)/mcL                             Neutro Auto               66 %  NA                             Lymph Auto                21 %  NA                             Mono Auto                 11 %  NA                             Eos Auto                  2 %  NA                             Abs Eos                   0.2 x10(3)/mcL                             Basophil Auto             0 %  NA                             Abs Neutro                5.17 x10(3)/mcL                             Abs Lymph                 1.6 x10(3)/mcL                             Abs Mono                  0.8 x10(3)/mcL                             Abs Baso                  0.0 x10(3)/mcL    1/28/2019 16:39 CST      POC PT                    11.4 second(s)                             POC INR                   1.0  , Interpretation Abnormal results  + bed bugs.    Radiology results:  Reviewed radiologist's report, Rad Results (ST)  < 12 hrs   Accession: [id number]  Order: CT Head W/O Contrast  Report Dt/Tm: 01/28/2019 16:47  Report:   CT Head W/O Contrast     REASON FOR EXAM: CODE FAST;Other (please specify)     TECHNIQUE: CT images of the head without IV contrast. Dose length  product is 1412 mGycm. Automatic exposure control was utilized to  limit radiation dose.     COMPARISON: 7/30/2018     FINDINGS:      No acute ischemic changes or infarct. No intracranial hemorrhage or  contusion. No mass, mass effect, midline shift, edema, or extra fluid  question. The gray-white matter differentiation maintained with  hypodensities the periventricular and deep cortical white matter.The  cerebral sulci  and basal cisterns are normal. No hydrocephalus. Remote  infarct of the left parietal lobe as well as temporal lobe with  associated gliosis and encephalomalacia.     The globes are intact. The paranasal sinuses and mastoid air cells are  well pneumatized.     IMPRESSION:      Limited evaluation the posterior fossa secondary to streak artifact.  No acute intracranial abnormality. Findings consistent  microangiopathy.     Results were given to Dr. Nicholson by Dr. Perez on January 28, 2019 at  1644 hours. 2 patient identifiers were utilized and read back  occurred.    .       Impression and Plan   Diagnosis   Accelerated hypertension (QNL28-SE I10)   Expressive aphasia (GEC48-DI R47.01)   Right hemiparesis (IXW05-EW G81.91)   Noncompliance with medications (RAQ36-LR Z91.14)   Chronic pain (FYE09-ML G89.29)   Infestation by bed bug (MSY08-VG B88.8)   Plan   Condition: Improved, Stable.    Disposition: Admit time  1/28/2019 18:34:00, Place in Observation Telemetry Unit, Brisa JEAN BAPTIST[facility].    Counseled: Patient, Family (Brother, Chan), Regarding diagnosis, Regarding diagnostic results, Regarding treatment plan, Patient indicated understanding of instructions, Brother verbalized understanding.    Notes: Radha GREGG, acted solely as a scribe for and in the presence of Dr. Nicholson who performed the service., IJazz, have independently performed the history, physical, medical decision making and procedures as documented above and agree with the scribe's documentation. .

## 2022-04-29 NOTE — H&P
Patient:   Lawrence Osullivan             MRN: [id number]            FIN: [id number]               Age:   58 years     Sex:  Male     :  1960   Associated Diagnoses:   Noncompliance with medications; Expressive aphasia; Accelerated hypertension; Chronic pain; Right hemiparesis; Altered mental status; Seizure; Electrolyte imbalance; Diabetes   Author:   Jose Ledezma MD      Basic Information   Source of history:  Medical record.    Present at bedside:  Medical personnel.    Referral source:  Emergency department.    History limitation:  Clinical condition.       Chief Complaint   2019 16:24 CST      c/o AMS onset 3 hours ago. pt has hx of previous CVA. pt lethargic but follows commands. pt mildly tachypneic. code fast called per Bharat JEAN BAPTISTE.         History of Present Illness   Patient brought mto ER by family. Patient had run out of blood pressure medications. According to Dr. Nicholson (ER MD), this happens often. Patient meds via post office and usually runs out when he fails to be taken to his primary care giver in order to get meds refilled. onset was 1 day prior with acute mental changes 3 hours prior to presentation to ER previous ocurrences: multiple. Treament today was to re-strart patient on his home meds.      Review of Systems   Can not be performed due to patients post CVA status   Constitutional:  Negative except as documented in history of present illness, No fever, No chills, No sweats.    Eye:  No icterus, No discharge, No double vision.    Ear/Nose/Mouth/Throat:  No decreased hearing, No ear pain.    Respiratory:  Negative, No sputum production, No hemoptysis.    Cardiovascular:  Negative.    Gastrointestinal:  Negative.    Genitourinary:  Negative.    Hematology/Lymphatics:  Negative.    Endocrine:  Negative.    Immunologic:  Negative.    Musculoskeletal:  Negative.    Integumentary:  Negative.    Neurologic:  Negative.    Psychiatric:  Negative.    All other systems are  negative      Health Status   Allergies:    Allergic Reactions (Selected)  Severity Not Documented  Aspirin- Unknown.  HydrALAZINE- Unknown.  Iodine- Unknown.,    Allergies (3) Active Reaction  aspirin unknown  hydrALAZINE unknown  iodine unknown   Current medications:  (Selected)   Inpatient Medications  Ordered  IVF Normal Saline NS Infusion 1,000 mL: 1,000 mL, 1,000 mL, IV, 75 mL/hr, start date 19 16:27:00 CST  Documented Medications  Documented  Janumet XR 50 mg-500 mg oral tablet, extended release: 1 tab(s), Oral, Once, # 30 tab(s), 0 Refill(s)  QUEtiapine 200 mg oral tablet, extended release: 200 mg = 1 tab(s), Oral, qPM, # 30 tab(s), 0 Refill(s)  amlodipine 10 mg oral tablet: 10 mg = 1 tab(s), Oral, Daily, # 30 tab(s), 0 Refill(s)  atorvastatin 40 mg oral tablet: 40 mg = 1 tab(s), Oral, Daily, # 30 tab(s), 0 Refill(s)  cloniDINE 0.3 mg oral tablet: 0.3 mg = 1 tab(s), Oral, BID, # 180 tab(s), 0 Refill(s)  clopidogrel 75 mg oral tablet: 75 mg = 1 tab(s), Oral, Daily, # 30 tab(s), 0 Refill(s)  diclofenac sodium 50 mg oral delayed release tablet: 50 mg = 1 tab(s), Oral, BID, # 60 tab(s), 0 Refill(s)  donepezil 5 mg oral tablet, disintegratin mg = 1 tab(s), Oral, Once a day (at bedtime), # 30 tab(s), 0 Refill(s)  folic acid 0.4 mg oral tablet: 0.4 mg = 1 tab(s), Oral, Daily, # 100 tab(s), 0 Refill(s)  levetiracetam 750 mg oral tablet: 750 mg = 1 tab(s), Oral, BID, # 60 tab(s), 0 Refill(s)  lisinopril 10 mg oral tablet: 10 mg = 1 tab(s), Oral, Daily, # 30 tab(s), 0 Refill(s)  memantine 10 mg oral tablet: 10 mg = 1 tab(s), Oral, BID, # 60 tab(s), 0 Refill(s)  metoprolol succinate 50 mg oral capsule, extended release: 50 mg = 1 cap(s), Oral, Daily, # 30 cap(s), 0 Refill(s)  omeprazole 20 mg oral DR capsule: 20 mg = 1 cap(s), Oral, Daily, # 30 cap(s), 0 Refill(s)  traZODone 100 mg oral tablet: 100 mg = 1 tab(s), Oral, Daily, # 60 tab(s), 0 Refill(s)  venlafaxine 150 mg oral tablet, extended release: 150 mg =  1 tab(s), Oral, Daily, # 30 tab(s), 0 Refill(s),    Medications (1) Active  Scheduled: (0)  Continuous: (1)  sodium chloride 0.9% 1,000 mL  1,000 mL, IV, 75 mL/hr  PRN: (0)   Problem list:    All Problems  Tobacco user / SNOMED CT [id number] / Confirmed,    Active Problems (1)  Tobacco user       Histories   Past Medical History:    No active or resolved past medical history items have been selected or recorded., HTN  CVA  Bed Bound   Family History:    No family history items have been selected or recorded.   Procedure history:    No active procedure history items have been selected or recorded.   Social History        Social & Psychosocial Habits    Alcohol  07/30/2018  Use: Never    Tobacco  07/30/2018  Use: Current every day smoker    Type: Cigarettes    01/28/2019  Use: 10 or more cigarettes (1/    Patient Wants Consult For Cessation Counseling No.        Physical Examination   General:  No acute distress.    Eye:  Vision unchanged.    HENT:  Normocephalic.    Neck:  Supple, Non-tender.    Respiratory:  Lungs are clear to auscultation, Respirations are non-labored.    Cardiovascular:  Normal rate, Regular rhythm.    Gastrointestinal:  Soft, Non-tender.    Vital Signs   1/29/2019 8:02 CST       Temperature Oral          36.6 DegC                             Temperature Oral (calculated)             97.88 DegF                             Peripheral Pulse Rate     66 bpm                             Respiratory Rate          18 br/min                             SpO2                      99 %                             Systolic Blood Pressure   155 mmHg  HI                             Diastolic Blood Pressure  87 mmHg                             Mean Arterial Pressure, Cuff              110 mmHg    1/29/2019 6:00 CST       Oxygen Therapy            Nasal cannula                             Oxygen Flow Rate          2 L/min    1/28/2019 23:51 CST      Temperature Oral          36.5 DegC                              Temperature Oral (calculated)             97.70 DegF                             Peripheral Pulse Rate     60 bpm                             Systolic Blood Pressure   150 mmHg  HI                             Diastolic Blood Pressure  93 mmHg  HI                             Mean Arterial Pressure, Cuff              112 mmHg    1/28/2019 22:00 CST      Heart Rate Monitored      66 bpm                             Oxygen Therapy            Nasal cannula                             Oxygen Flow Rate          2 L/min    1/28/2019 21:58 CST      Temperature Oral          36.4 DegC                             Temperature Oral (calculated)             97.52 DegF                             Peripheral Pulse Rate     67 bpm                             SpO2                      100 %                             Systolic Blood Pressure   142 mmHg  HI                             Diastolic Blood Pressure  95 mmHg  HI                             Mean Arterial Pressure, Cuff              111 mmHg    1/28/2019 20:30 CST      Peripheral Pulse Rate     75 bpm                             Heart Rate Monitored      76 bpm                             Respiratory Rate          19 br/min                             SpO2                      99 %                             Systolic Blood Pressure   134 mmHg                             Diastolic Blood Pressure  96 mmHg  HI                             Mean Arterial Pressure, Cuff              109 mmHg    1/28/2019 19:30 CST      Peripheral Pulse Rate     71 bpm                             Heart Rate Monitored      71 bpm                             Respiratory Rate          20 br/min                             SpO2                      95 %                             Systolic Blood Pressure   163 mmHg  HI                             Diastolic Blood Pressure  104 mmHg  HI                             Mean Arterial Pressure, Cuff              124 mmHg    1/28/2019 19:00 CST      Peripheral  Pulse Rate     73 bpm                             Heart Rate Monitored      74 bpm                             Respiratory Rate          19 br/min                             SpO2                      96 %                             Systolic Blood Pressure   167 mmHg  HI                             Diastolic Blood Pressure  108 mmHg  HI                             Mean Arterial Pressure, Cuff              128 mmHg    1/28/2019 18:30 CST      Heart Rate Monitored      67 bpm                             Respiratory Rate          21 br/min                             SpO2                      98 %                             Systolic Blood Pressure   171 mmHg  HI                             Diastolic Blood Pressure  107 mmHg  HI                             Mean Arterial Pressure, Cuff              128 mmHg    1/28/2019 17:30 CST      Peripheral Pulse Rate     65 bpm                             Heart Rate Monitored      65 bpm                             Respiratory Rate          20 br/min                             SpO2                      99 %                             Systolic Blood Pressure   160 mmHg  HI                             Diastolic Blood Pressure  103 mmHg  HI                             Mean Arterial Pressure, Cuff              122 mmHg    1/28/2019 17:15 CST      Peripheral Pulse Rate     70 bpm                             Heart Rate Monitored      70 bpm                             Respiratory Rate          21 br/min                             SpO2                      98 %                             Systolic Blood Pressure   153 mmHg  HI                             Diastolic Blood Pressure  99 mmHg  HI                             Mean Arterial Pressure, Cuff              117 mmHg    1/28/2019 17:04 CST      Respiratory Rate          23 br/min    1/28/2019 17:00 CST      Peripheral Pulse Rate     77 bpm                             Heart Rate Monitored      76 bpm                              Respiratory Rate          17 br/min                             SpO2                      98 %                             Systolic Blood Pressure   168 mmHg  HI                             Diastolic Blood Pressure  114 mmHg  HI                             Mean Arterial Pressure, Cuff              132 mmHg    1/28/2019 16:33 CST      Peripheral Pulse Rate     85 bpm                             Respiratory Rate          20 br/min                             SpO2                      98 %                             Saturation Probe Site     Hand, left                             Oxygen Therapy            Nasal cannula                             Oxygen Flow Rate          2 L/min                             Systolic Blood Pressure   174 mmHg  HI                             Diastolic Blood Pressure  115 mmHg  HI    1/28/2019 16:24 CST      Temperature Temporal Artery               36.3 DegC                             Peripheral Pulse Rate     85 bpm                             Respiratory Rate          28 br/min  HI                             SpO2                      100 %                             Oxygen Therapy            Room air                             Systolic Blood Pressure   185 mmHg  HI                             Diastolic Blood Pressure  127 mmHg  HI        Vital Signs (last 24 hrs)_____  Last Charted___________  Temp Oral     36.6 DegC  (JAN 29 08:02)  Heart Rate Peripheral   66 bpm  (JAN 29 08:02)  Resp Rate         18 br/min  (JAN 29 08:02)  SBP      H 155mmHg  (JAN 29 08:02)  DBP      87 mmHg  (JAN 29 08:02)  SpO2      99 %  (JAN 29 08:02)  Weight      99.5 kg  (JAN 28 16:24)   Genitourinary:  No costovertebral angle tenderness.    Lymphatics:  No lymphadenopathy neck, axilla, groin.    Musculoskeletal:  No swelling.    Integumentary:  Warm.    Neurologic:  Alert.    Cognition and Speech:  Can not be properly ionterogated.    Psychiatric:  Can not be properly interogatd..       Review /  Management   Laboratory Results   Last 5 Days Lab Results : PowerNote Discrete Results   1/28/2019 16:46 CST      POC TCO2                  28.0 mmol/L                             POC Hb                    13.9 mg/dL                             POC Hct                   41.0 %                             POC Sodium                142 mmol/L                             POC Potassium             3.4 mmol/L  LOW                             POC Chloride              103 mmol/L                             POC Ion Calcium           1.08 mmol/L  LOW                             POC Glucose               121 mg/dL  HI                             POC BUN                   11.0 mg/dL                             POC Creatinine            0.9 mg/dL                             POC AGAP                  16.0  NA    1/28/2019 16:44 CST      POC Troponin              0.01 ng/mL    1/28/2019 16:40 CST      WBC                       7.9 x10(3)/mcL                             RBC                       4.62 x10(6)/mcL  LOW                             Hgb                       12.8 gm/dL  LOW                             Hct                       40.6 %  LOW                             Platelet                  220 x10(3)/mcL                             MCV                       87.9 fL                             MCH                       27.7 pg                             MCHC                      31.5 gm/dL  LOW                             RDW                       14.5 %                             MPV                       9.4 fL                             Abs Neut                  5.17 x10(3)/mcL                             Neutro Auto               66 %  NA                             Lymph Auto                21 %  NA                             Mono Auto                 11 %  NA                             Eos Auto                  2 %  NA                             Abs Eos                   0.2 x10(3)/mcL                              Basophil Auto             0 %  NA                             Abs Neutro                5.17 x10(3)/mcL                             Abs Lymph                 1.6 x10(3)/mcL                             Abs Mono                  0.8 x10(3)/mcL                             Abs Baso                  0.0 x10(3)/mcL                             PT                        13.3 second(s)                             INR                       1.0                             PTT                       24.5 second(s)  LOW                             Sodium Lvl                139 mmol/L                             Potassium Lvl             3.4 mmol/L  LOW                             Chloride                  105 mmol/L                             CO2                       29.0 mmol/L                             Calcium Lvl               8.7 mg/dL                             Glucose Lvl               124 mg/dL  HI                             BUN                       11.0 mg/dL                             Creatinine                1.12 mg/dL                             eGFR-AA                   >60 mL/min/1.73 m2  NA                             eGFR-EWA                  >60 mL/min/1.73 m2  NA                             Bili Total                0.2 mg/dL                             Bili Direct               0.10 mg/dL                             Bili Indirect             0.10 mg/dL                             AST                       31 unit/L                             ALT                       16 unit/L                             Alk Phos                  90 unit/L                             Total Protein             7.4 gm/dL                             Albumin Lvl               3.60 gm/dL                             Globulin                  3.80 gm/dL  HI                             A/G Ratio                 0.9 ratio  LOW        Radiology results   CT   Condition:  Fair.       Impression and Plan   Diagnosis      Noncompliance with medications (UPW34-RT Z91.14).     Expressive aphasia (YIF88-HR R47.01).     Accelerated hypertension (ULH08-AI I10).     Chronic pain (QCU82-SU G89.29).     Right hemiparesis (QBL83-YN G81.91).     Altered mental status (ED [id number]).     Course:  Improving, place on obs. Re-start home blood pressure meds. Plan D/C when stable..    Orders     Orders   Patient Care:  Unconscious patient: Blood glucose <70, give 1 mg glucagon IM, and place IV access; move to protocol above. (Order): 1/29/2019 8:21 CST, Unconscious patient: Blood glucose <70, give 1 mg glucagon IM, and place IV access; move to protocol above.  Unconscious patient: Repeat BG every 15 - 20 minutes following episode until BG > 70 per protocol. (Order): 1/29/2019 8:21 CST, Unconscious patient: Repeat BG every 15 - 20 minutes following episode until BG > 70 per protocol.  Unconscious patient: Blood glucose <50 mg/dl, give 50 ml of D50W IVP and repeat as ordered until patient wakes up or BG > 70 mg/dl. (Order): 1/29/2019 8:21 CST, Unconscious patient: Blood glucose <50 mg/dl, give 50 ml of D50W IVP and repeat as ordered until patient wakes up or BG > 70 mg/dl.  Unconscious patient: Blood glucose 50 - 69, give 25 ml of D50W IVP and repeat as ordered until patient wakes up or BG > 70mg/dl. (Order): 1/29/2019 8:21 CST, Unconscious patient: Blood glucose 50 - 69, give 25 ml of D50W IVP and repeat as ordered until patient wakes up or BG > 70mg/dl.  Unconscious patient: Blood glucose 70 - 100 mg/dl, give 25 ml of D50W IVP but look for other source of altered mental status. (Order): 1/29/2019 8:21 CST, Unconscious patient: Blood glucose 70 - 100 mg/dl, give 25 ml of D50W IVP but look for other source of altered mental status.  Conscious patient: For blood glucose <70 repeat BG every 15-20 minutes and treat again if hypoglycemia persists. (Order): 1/29/2019 8:21 CST, Conscious patient: For blood glucose <70 repeat  BG every 15-20 minutes and treat again if hypoglycemia persists.  Conscious patient: Blood glucose <45 mg/dl and IV access, give 25 ml D50W IVP. If no IV access available, 1 mg glucagon IM and 15 grams of fast-acting carbohydrate. Notify treating MD for further orders. (Order): 1/29/2019 8:21 CST, Conscious patient: Blood glucose <45 mg/dl and IV access, give 25 ml D50W IVP. If no IV access available, 1 mg glucagon IM and 15 grams of fast-acting carbohydrate. Notify treating MD for further orders.  Conscious patient: Blood glucose 45-69 mg/dl; give 15 gm of fast acting carbs (4 oz. juice, regular soft drink, glucose tabs). Rationale: Low blood sugar; patient needs fast acting sugar. (Order): 1/29/2019 8:21 CST, Conscious patient: Blood glucose 45-69 mg/dl; give 15 gm of fast acting carbs (4 oz. juice, regular soft drink, glucose tabs). Rationale: Low blood sugar; patient needs fast acting sugar.  Conscious patient: Blood glucose  mg/dl, having symptoms, give light snack such as cuong crackers and milk. Rationale: Symptomatic patient needs protein and carbs for stability. (Order): 1/29/2019 8:21 CST, Conscious patient: Blood glucose  mg/dl, having symptoms, give light snack such as cuong crackers and milk. Rationale: Symptomatic patient needs protein and carbs for stability.  Peripheral IV Insertion (Order): 1/29/2019 8:21 CST  Blood glucose equal to or greater than 400 (Order): 1/29/2019 8:21 CST, Blood glucose equal to or greater than 400  Blood glucose equal to or less than 60 (Order): 1/29/2019 8:21 CST, Blood glucose equal to or less than 60  Do not arbitrarily hold insulin without calling physician (Order): 1/29/2019 8:21 CST, Do not arbitrarily hold insulin without calling physician  Blood Glucose Monitoring POC (Order): 1/29/2019 8:21 CST, q4hr, 1/29/2019 9:00 CST  Pharmacy:  glucagon (Order): 1 mg, IM, q10min PRN for blood glucose, first dose 1/29/2019 8:21 CST, Unconscious patient: Patient  with NO IV access available and BG < 70 mg/dl.  Dextrose 50% and Water  (50 mL vial/syringe) (Order): 50 mL, IV Push, As Directed PRN for blood glucose, Infuse over: 5 minute(s), first dose 1/29/2019 8:21 CST, Unconscious patient: Repeat as ordered per protocol.  Dextrose 50% and Water  (50 mL vial/syringe) (Order): 25 mL, IV Push, As Directed PRN for blood glucose, Infuse over: 5 minute(s), first dose 1/29/2019 8:21 CST, Unconscious patient: Repeat as ordered per protocol.  Dextrose 50% and Water  (50 mL vial/syringe) (Order): 25 mL, IV Push, Once PRN for blood glucose, Infuse over: 5 minute(s), first dose 1/29/2019 8:21 CST, Unconscious patient: Look for other source of altered mental status.  glucagon (Order): 1 mg, IM, q10min PRN for blood glucose, first dose 1/29/2019 8:21 CST, Conscious Patient with NO IV access available and BG < 45 mg/dl.  Dextrose 50% in Water intravenous solution (Order): 25 mL, IV Push, As Directed PRN for blood glucose, Infuse over: 5 minute(s), first dose 1/29/2019 8:21 CST, Conscious patient.  insulin lispro (Order): 2-14 units, Subcutaneous, As Directed PRN for blood glucose, first dose 1/29/2019 8:21 CST  venlafaxine 150 mg oral tablet, extended release (Order): 150 mg, form: Tab-ER, Oral, Daily, first dose 1/29/2019 9:00 CST  traZODone 100 mg oral tablet (Order): 100 mg, form: Tab, Oral, Daily, first dose 1/29/2019 9:00 CST  QUEtiapine 200 mg oral tablet, extended release (Order): 200 mg, form: Tab-ER, Oral, qPM, first dose 1/29/2019 21:00 CST  pantoprazole (Order): 40 mg, form: Tab-EC, Oral, Daily, first dose 1/30/2019 6:00 CST  metoprolol succinate 50 mg oral tablet extended release (Order): 50 mg, form: Tab XL, Oral, Daily, first dose 1/29/2019 9:00 CST  Janumet XR 50 mg-500 mg oral tablet, extended release (Order): 1 tablet, form: Tab-ER, Oral, Once, first dose 1/29/2019 9:00 CST, stop date 1/29/2019 9:00 CST  memantine 5 mg oral tablet (Order): 10 mg, form: Tab, Oral, BID,  first dose 1/29/2019 9:00 CST  lisinopril 10 mg oral tablet (Order): 10 mg, form: Tab, Oral, Daily, first dose 1/29/2019 9:00 CST  levetiracetam 750 mg oral tablet (Order): 750 mg, form: Tab, Oral, BID, first dose 1/29/2019 9:00 CST  folic acid 0.4 mg oral tablet (Order): 0.4 mg, form: Tab, Oral, Daily, first dose 1/29/2019 9:00 CST  donepezil (Order): 5 mg, form: Tab-Dis, Oral, Once a day (at bedtime), first dose 1/29/2019 21:00 CST  clopidogrel 75 mg oral tablet (Order): 75 mg, form: Tab, Oral, Daily, first dose 1/29/2019 9:00 CST  cloniDINE 0.3 mg oral tablet (Order): 0.3 mg, form: Tab, Oral, BID, first dose 1/29/2019 9:00 CST  atorvastatin 40 mg oral tablet (Order): 40 mg, form: Tab, Oral, Daily, first dose 1/29/2019 17:00 CST  amLODIPine (Order): 10 mg, form: Tab, Oral, Daily, first dose 1/29/2019 9:00 CST.     Orders   Pharmacy:  KCL 20 mEq Oral Tab (Order): 20 mEq, form: Tab-ER, Oral, Daily, first dose 1/29/2019 9:00 CST.     Diagnosis     Noncompliance with medications (DYU09-RT Z91.14).     Seizure (YHL42-TE R56.9).     Expressive aphasia (WKO56-IU R47.01).     Accelerated hypertension (MET47-UC I10).     Chronic pain (KTP34-OG G89.29).     Right hemiparesis (MHZ24-VQ G81.91).     Electrolyte imbalance (EKT02-ES E87.8).     Diabetes (IAM17-VS E11.9).     Altered mental status ([facility] [id number]).     Course:  Improving.    Education and Follow-up:       Counseled: Patient, Family, Regarding diagnosis, Regarding treatment, Regarding medications.

## 2022-04-29 NOTE — DISCHARGE SUMMARY
Patient:   Lawrence Osullivan             MRN: [id number]            FIN: [id number]               Age:   58 years     Sex:  Male     :  1960   Associated Diagnoses:   Noncompliance with medications; Seizure; Expressive aphasia; Accelerated hypertension; Chronic pain; Right hemiparesis; Electrolyte imbalance; Diabetes; Altered mental status   Author:   Jose Ledezma MD      Discharge Information      Discharge Summary Information   Admit/Discharge Dates   Admit Date: 2019  Discharge Date: 2019   Physicians   Attending Physician - Brisa JEAN BAPTISTE, Jose  Admitting Physician - Brisa JEAN BAPTISTE, Jose  Consulting Physician - Brisa JEAN BAPTISTE, Jose  Primary Care Physician - Physician MD, Non Staff   Discharge Diagnosis   Type 2 diabetes mellitus without complications (E11.9)   Patient's other noncompliance with medication regimen (Z91.14)   Other chronic pain (G89.29)   Essential (primary) hypertension (I10)   Other disorders of electrolyte and fluid balance, not elsewhere classified (E87.8)   Aphasia (R47.01)   Hemiplegia, unspecified affecting right dominant side (G81.91)   Unspecified convulsions (R56.9)    Procedures   No procedures recorded for this visit.   Immunizations   No immunizations recorded for this visit.   Discharge Medications   Discharge Med Rec is not complete      Hospital Course   Hospital Course   Time Spent on Discharge: 40 minutes.     Patient came in and due to neurological changes.  He has been out of his blood pressure medication for almost 2 weeks.  He came in with accelerated hypertension.  Scans of his head showed no changes from previous stroke.  Neurology stated that they are not going to Cialis or any changes in the CAT scan.  Dr. Nicholson not radiating to me that this patient apparently has a lot of problems following up with his primary caregiver.  He often runs out of blood pressure medication and ends up in the emergency room.  I restarted his blood pressure  medication and he responded very appropriately.  His neurological complaints have mostly resolved.  Small lingering complaints will probably resolve in the next couple days with the resolution of his hypertensive crisis.  I will give him prescriptions for his blood pressure medications.  I urged him to keep up with his primary care appointments.      Physical Examination   Vital Signs   1/29/2019 15:40 CST      Temperature Oral          36.8 DegC                             Temperature Oral (calculated)             98.24 DegF                             Peripheral Pulse Rate     66 bpm                             Respiratory Rate          18 br/min                             SpO2                      97 %                             Systolic Blood Pressure   117 mmHg                             Diastolic Blood Pressure  81 mmHg                             Mean Arterial Pressure, Cuff              93 mmHg    1/29/2019 11:47 CST      Temperature Oral          36.7 DegC                             Temperature Oral (calculated)             98.06 DegF                             Peripheral Pulse Rate     71 bpm                             Respiratory Rate          18 br/min                             SpO2                      99 %                             Systolic Blood Pressure   113 mmHg                             Diastolic Blood Pressure  75 mmHg                             Mean Arterial Pressure, Cuff              87 mmHg    1/29/2019 8:02 CST       Temperature Oral          36.6 DegC                             Temperature Oral (calculated)             97.88 DegF                             Peripheral Pulse Rate     66 bpm                             Respiratory Rate          18 br/min                             SpO2                      99 %                             Systolic Blood Pressure   155 mmHg  HI                             Diastolic Blood Pressure  87 mmHg                             Mean  Arterial Pressure, Cuff              110 mmHg    1/29/2019 6:00 CST       Oxygen Therapy            Nasal cannula                             Oxygen Flow Rate          2 L/min    1/28/2019 23:51 CST      Temperature Oral          36.5 DegC                             Temperature Oral (calculated)             97.70 DegF                             Peripheral Pulse Rate     60 bpm                             Systolic Blood Pressure   150 mmHg  HI                             Diastolic Blood Pressure  93 mmHg  HI                             Mean Arterial Pressure, Cuff              112 mmHg    1/28/2019 22:00 CST      Heart Rate Monitored      66 bpm                             Oxygen Therapy            Nasal cannula                             Oxygen Flow Rate          2 L/min    1/28/2019 21:58 CST      Temperature Oral          36.4 DegC                             Temperature Oral (calculated)             97.52 DegF                             Peripheral Pulse Rate     67 bpm                             SpO2                      100 %                             Systolic Blood Pressure   142 mmHg  HI                             Diastolic Blood Pressure  95 mmHg  HI                             Mean Arterial Pressure, Cuff              111 mmHg    1/28/2019 20:30 CST      Peripheral Pulse Rate     75 bpm                             Heart Rate Monitored      76 bpm                             Respiratory Rate          19 br/min                             SpO2                      99 %                             Systolic Blood Pressure   134 mmHg                             Diastolic Blood Pressure  96 mmHg  HI                             Mean Arterial Pressure, Cuff              109 mmHg    1/28/2019 19:30 CST      Peripheral Pulse Rate     71 bpm                             Heart Rate Monitored      71 bpm                             Respiratory Rate          20 br/min                             SpO2                       95 %                             Systolic Blood Pressure   163 mmHg  HI                             Diastolic Blood Pressure  104 mmHg  HI                             Mean Arterial Pressure, Cuff              124 mmHg    1/28/2019 19:00 CST      Peripheral Pulse Rate     73 bpm                             Heart Rate Monitored      74 bpm                             Respiratory Rate          19 br/min                             SpO2                      96 %                             Systolic Blood Pressure   167 mmHg  HI                             Diastolic Blood Pressure  108 mmHg  HI                             Mean Arterial Pressure, Cuff              128 mmHg    1/28/2019 18:30 CST      Heart Rate Monitored      67 bpm                             Respiratory Rate          21 br/min                             SpO2                      98 %                             Systolic Blood Pressure   171 mmHg  HI                             Diastolic Blood Pressure  107 mmHg  HI                             Mean Arterial Pressure, Cuff              128 mmHg    1/28/2019 17:30 CST      Peripheral Pulse Rate     65 bpm                             Heart Rate Monitored      65 bpm                             Respiratory Rate          20 br/min                             SpO2                      99 %                             Systolic Blood Pressure   160 mmHg  HI                             Diastolic Blood Pressure  103 mmHg  HI                             Mean Arterial Pressure, Cuff              122 mmHg    1/28/2019 17:15 CST      Peripheral Pulse Rate     70 bpm                             Heart Rate Monitored      70 bpm                             Respiratory Rate          21 br/min                             SpO2                      98 %                             Systolic Blood Pressure   153 mmHg  HI                             Diastolic Blood Pressure  99 mmHg  HI                             Mean  Arterial Pressure, Cuff              117 mmHg    1/28/2019 17:04 CST      Respiratory Rate          23 br/min    1/28/2019 17:00 CST      Peripheral Pulse Rate     77 bpm                             Heart Rate Monitored      76 bpm                             Respiratory Rate          17 br/min                             SpO2                      98 %                             Systolic Blood Pressure   168 mmHg  HI                             Diastolic Blood Pressure  114 mmHg  HI                             Mean Arterial Pressure, Cuff              132 mmHg    1/28/2019 16:33 CST      Peripheral Pulse Rate     85 bpm                             Respiratory Rate          20 br/min                             SpO2                      98 %                             Saturation Probe Site     Hand, left                             Oxygen Therapy            Nasal cannula                             Oxygen Flow Rate          2 L/min                             Systolic Blood Pressure   174 mmHg  HI                             Diastolic Blood Pressure  115 mmHg  HI    1/28/2019 16:24 CST      Temperature Temporal Artery               36.3 DegC                             Peripheral Pulse Rate     85 bpm                             Respiratory Rate          28 br/min  HI                             SpO2                      100 %                             Oxygen Therapy            Room air                             Systolic Blood Pressure   185 mmHg  HI                             Diastolic Blood Pressure  127 mmHg  HI     General:  Alert and oriented, No acute distress.    Eye:  Vision unchanged.    HENT:  Normocephalic.    Neck:  Supple, Non-tender.    Respiratory:  Lungs are clear to auscultation, Respirations are non-labored.    Cardiovascular:  Normal rate, Regular rhythm.    Gastrointestinal:  Soft, Non-tender.    Genitourinary:  No costovertebral angle tenderness.    Lymphatics:  No lymphadenopathy neck,  axilla, groin.    Musculoskeletal:  No swelling.    Integumentary:  Warm.    Neurologic:  Alert.    Psychiatric:  Cooperative.       Discharge Plan   Discharge Summary Plan   Discharge disposition: discharge to home into the care of family member.     Prescriptions: continue same medications, reviewed with patient, called to pharmacy, written and given to patient.     Orders     Orders   Pharmacy:  QUEtiapine 200 mg oral tablet, extended release (Document): 200 mg = 1 tab(s), Oral, qPM, 0 Refill(s)  QUEtiapine 200 mg oral tablet, extended release (Prescribe): 200 mg = 1 tab(s), Oral, qPM, # 30 tab(s), 0 Refill(s), Pharmacy: [facility] [place]  omeprazole 20 mg oral DR capsule (Prescribe): 20 mg = 1 cap(s), Oral, Daily, # 30 cap(s), 0 Refill(s), Pharmacy: [facility] [place]  metoprolol succinate 50 mg oral tablet extended release (Document): 50 mg = 1 tab(s), Oral, Daily, 0 Refill(s)  metoprolol succinate 50 mg oral capsule, extended release (Prescribe): 50 mg = 1 cap(s), Oral, Daily, # 30 cap(s), 0 Refill(s), Pharmacy: [facility] [place]  venlafaxine 150 mg oral capsule, extended release (Document): 150 mg, Oral, Daily, 0 Refill(s)  venlafaxine 150 mg oral tablet, extended release (Prescribe): 150 mg = 1 tab(s), Oral, Daily, # 30 tab(s), 0 Refill(s), Pharmacy: [facility] [place]  traZODone 100 mg oral tablet (Document): 100 mg = 1 tab(s), Oral, At Bedtime, 0 Refill(s)  traZODone 100 mg oral tablet (Prescribe): 100 mg = 1 tab(s), Oral, Daily, # 30 tab(s), 0 Refill(s), Pharmacy: [facility] [place]  Template Non-Formulary Med (Document): 0 Refill(s)  Janumet XR 50 mg-500 mg oral tablet, extended release (Prescribe): 1 tab(s), Oral, Once, # 1 tab(s), 0 Refill(s), Pharmacy: [facility]  memantine 5 mg oral tablet (Document): 10 mg = 2 tab(s), Oral, BID, 0 Refill(s)  memantine 10 mg oral tablet (Prescribe): 10 mg = 1 tab(s), Oral,  BID, # 60 tab(s), 0 Refill(s), Pharmacy: [facility] [place]  lisinopril 10 mg oral tablet (Document): 10 mg = 1 tab(s), Oral, Daily, 0 Refill(s)  lisinopril 10 mg oral tablet (Prescribe): 10 mg = 1 tab(s), Oral, Daily, # 30 tab(s), 0 Refill(s), Pharmacy: [facility], [place]  levetiracetam 750 mg oral tablet (Document): Oral, BID, 0 Refill(s)  levetiracetam 750 mg oral tablet (Prescribe): 750 mg = 1 tab(s), Oral, BID, # 60 tab(s), 0 Refill(s), Pharmacy: [facility] [place]  folic acid 1 mg oral tablet (Prescribe): 1 mg = 1 tab(s), Oral, Daily, # 90 tab(s), 0 Refill(s), Pharmacy: [facility] [place]  donepezil 5 mg oral tablet (Document): 5 mg = 1 tab(s), Oral, Once a day (at bedtime), 0 Refill(s)  donepezil 5 mg oral tablet, disintegrating (Prescribe): 5 mg = 1 tab(s), Oral, Once a day (at bedtime), # 30 tab(s), 0 Refill(s), Pharmacy: [facility], [place]  clopidogrel 75 mg oral tablet (Document): 75 mg = 1 tab(s), Oral, Daily, 0 Refill(s)  clopidogrel 75 mg oral tablet (Prescribe): 75 mg = 1 tab(s), Oral, Daily, # 30 tab(s), 0 Refill(s), Pharmacy: [facility] [place]  cloniDINE 0.3 mg oral tablet (Document): 0.3 mg = 1 tab(s), Oral, BID, 0 Refill(s)  cloniDINE 0.3 mg oral tablet (Prescribe): 0.3 mg = 1 tab(s), Oral, BID, # 60 tab(s), 0 Refill(s), Pharmacy: [facility], [place]  atorvastatin 40 mg oral tablet (Document): 40 mg = 1 tab(s), Oral, Daily, 0 Refill(s)  atorvastatin 40 mg oral tablet (Prescribe): 40 mg = 1 tab(s), Oral, Daily, # 30 tab(s), 0 Refill(s), Pharmacy: [facility] [place]  amlodipine 5 mg oral tablet (Document): 10 mg = 2 tab(s), Oral, Daily, 0 Refill(s)  amlodipine 10 mg oral tablet (Prescribe): 10 mg = 1 tab(s), Oral, Daily, # 30 tab(s), 0 Refill(s), Pharmacy: [facility] [place]  folic acid 0.4 mg oral tablet (Discontinue): 1/29/2019 17:05 CST  Janumet XR 50 mg-500 mg oral  tablet, extended release (Discontinue): 1/29/2019 17:05 CST  lisinopril 10 mg oral tablet (Discontinue): 1/29/2019 17:05 CST  venlafaxine 150 mg oral tablet, extended release (Discontinue): 1/29/2019 17:05 CST  metoprolol succinate 50 mg oral capsule, extended release (Discontinue): 1/29/2019 17:06 CST  memantine 10 mg oral tablet (Discontinue): 1/29/2019 17:05 CST  diclofenac sodium 50 mg oral delayed release tablet (Discontinue): 1/29/2019 17:04 CST  atorvastatin 40 mg oral tablet (Discontinue): 1/29/2019 17:04 CST  QUEtiapine 200 mg oral tablet, extended release (Discontinue): 1/29/2019 17:06 CST  traZODone 100 mg oral tablet (Discontinue): 1/29/2019 17:05 CST  donepezil 5 mg oral tablet, disintegrating (Discontinue): 1/29/2019 17:04 CST  clopidogrel 75 mg oral tablet (Discontinue): 1/29/2019 17:04 CST  omeprazole 20 mg oral DR capsule (Discontinue): 1/29/2019 17:06 CST  cloniDINE 0.3 mg oral tablet (Discontinue): 1/29/2019 17:04 CST  amlodipine 10 mg oral tablet (Discontinue): 1/29/2019 17:04 CST  levetiracetam 750 mg oral tablet (Discontinue): 1/29/2019 17:05 CST.     Orders   Admit/Transfer/Discharge:  Discharge (Order): 1/29/2019 17:07 CST, Home, Follow up with your primary caregiver..        Diagnosis     Noncompliance with medications ([id number]-ZI Z91.14).     Seizure ([id number]-QF R56.9).     Expressive aphasia (KNF52-AZ R47.01).     Accelerated hypertension (HFK25-JW I10).     Chronic pain (QXD68-VJ G89.29).     Right hemiparesis (TEO44-HF G81.91).     Electrolyte imbalance (DBZ54-FQ E87.8).     Diabetes (VPQ11-OF E11.9).     Altered mental status (ED [id number]).     Course   Well controlled.     Education and Follow-up   Counseled: patient, regarding diagnosis, regarding treatment, regarding medications.

## 2022-04-29 NOTE — ED PROVIDER NOTES
Patient:   Lawrence Osullivan            MRN: [id number]            FIN: [id number]               Age:   60 years     Sex:  Male     :  1960   Associated Diagnoses:   Weakness of right upper extremity; Weakness of right lower extremity   Author:   Anjum Contreras MD      Basic Information   Time seen: Date & time 3/25/2021 10:58:00.   History source: Patient.   Arrival mode: Private vehicle.   History limitation: None.   Additional information: Chief Complaint from Nursing Triage Note : Chief Complaint   3/25/2021 10:52 CDT      Chief Complaint           PT WEARING MASK REPORT SENT BY [facility] FOR EVAL OF ALTERED GAIT,  DIZZINESS AND SOB SINCE THIS AM. ? ON EXACT TIME. PT EVAL / DR EDMONDS. CODE FAST AT 10:50.  CBG 88. PT TO CT FROM TRIAGE.  (Modified) .   Provider/Visit info:   Time Seen:  Anjum Contreras MD / 2021 10:58  .   History of Present Illness   The patient presents with altered speech, altered gait and altered coordination.  The onset was 5.5hrs (when awoke at 5am), last know well 9pm yesterday (13.5hrs).  The course/duration of symptoms is constant.  Location: Right upper extremity lower extremity. The character of symptoms is weakness.  The degree at onset was moderate.  The degree at maximum was moderate.  The degree at present is moderate.  Risk factors consist of History of CVA with prior right sided deficits.  Prior episodes: occasional.  Therapy today: none.  Associated symptoms: denies chest pain, denies headache, denies nausea and denies vomiting.      58-year-old gentleman presents emergency room complaints of right-sided weakness.  Patient reports symptoms began at 5 AM this morning upon awakening.  Patient reports last known well at 9 PM last night prior to going to sleep.  History of CVAs in the past.  Patient reports increased weakness right upper right lower extremity, difficulty walking, and difficulty with speech.  Patient has a history of aphasia from prior CVA, and  reports his aphasia is at his baseline..        Review of Systems   Constitutional symptoms:  No fever, no chills.    ENMT symptoms:  No sore throat, no nasal congestion.    Respiratory symptoms:  No shortness of breath, no orthopnea.    Cardiovascular symptoms:  No chest pain, no palpitations.    Gastrointestinal symptoms:  No abdominal pain, no nausea, no vomiting.    Genitourinary symptoms:  No dysuria, no hematuria.    Neurologic symptoms:  No headache, no dizziness.              Additional review of systems information: All other systems reviewed and otherwise negative.      Health Status   Allergies:    Allergic Reactions (Selected)  Severity Not Documented  Aspirin- Rash and unknown.  Contrast media (iodine-based)- Throat closes and unknown.  Iodine- Rash and unknown.  Penicillin- Rash.  Nonallergic Reactions (Selected)  Severity Not Documented  HydrALAZINE- Unknown, rash and tachycardia.,    Allergies (5) Active Reaction  aspirin unknown  contrast media (iodine-based) unknown  hydrALAZINE Tachycardia  iodine unknown  penicillin rash  .   Medications:  (Selected)   Inpatient Medications  Ordered  Dilaudid 2 mg/mL injectable solution: 1 mg, form: Injection, IV Push, Once, first dose 12/17/13 23:13:00 CST, stop date 12/17/13 23:13:00 CST, STAT  IVF Normal Saline NS Bolus 250ml 250 mL: 250 mL, 250 mL, IV, 250 mL/hr, start date 03/25/21 10:57:00 CDT, bolus dose: 250 mL, 2.27, m2  IVF Normal Saline NS Infusion 1,000 mL: 1,000 mL, 1,000 mL, IV, 75 mL/hr, start date 03/25/21 10:57:00 CDT, 2.27, m2  Prescriptions  Prescribed  Janumet XR 50 mg-500 mg oral tablet, extended release: 1 tab(s), Oral, Daily, # 90 tab(s), 2 Refill(s), Pharmacy: [facility], 174, cm, Height/Length Dosing, 02/01/21 9:04:00 CST, 114.7, kg, Weight Dosing, 02/01/21 9:04:00 CST  Keppra 500 mg oral tablet: 1,000 mg = 2 tab(s), Oral, BID, # 360 tab(s), 3 Refill(s), Pharmacy: [facility], 174, cm, Height/Length Dosing, 02/01/21 9:04:00  CST, 114.7, kg, Weight Dosing, 02/01/21 9:04:00 CST  Pantoprazole 40 mg ORAL EC-Tablet: 40 mg = 1 tab(s), Oral, BID, # 180 tab(s), 3 Refill(s), Pharmacy: [facility], 174, cm, Height/Length Dosing, 02/01/21 9:04:00 CST, 114.7, kg, Weight Dosing, 02/01/21 9:04:00 CST  ProAir HFA 90 mcg/inh inhalation aerosol with adapter: 1 puff(s), INH, q4hr, PRN PRN for wheezing, # 1 EA, 1 Refill(s), Pharmacy: [facility], 174, cm, Height/Length Dosing, 03/12/21 10:54:00 CST, 114.9, kg, Weight Dosing, 03/12/21 10:54:00 CST  QUEtiapine 200 mg oral tablet, extended release: 200 mg = 1 tab(s), Oral, qPM, # 90 tab(s), 5 Refill(s), Pharmacy: [facility], 174, cm, Height/Length Dosing, 02/01/21 9:04:00 CST, 114.7, kg, Weight Dosing, 02/01/21 9:04:00 CST  Vitamin B12 1000 mcg oral tablet: 1,000 mcg = 1 tab(s), Oral, Daily, # 90 tab(s), 5 Refill(s), Pharmacy: [facility], 174, cm, Height/Length Dosing, 02/01/21 9:04:00 CST, 114.7, kg, Weight Dosing, 02/01/21 9:04:00 CST  amlodipine 10 mg oral tablet: 10 mg = 1 tab(s), Oral, Daily, # 90 tab(s), 5 Refill(s), Pharmacy: [facility], 174, cm, Height/Length Dosing, 02/01/21 9:04:00 CST, 114.7, kg, Weight Dosing, 02/01/21 9:04:00 CST  atorvastatin 40 mg oral tablet: 40 mg = 1 tab(s), Oral, Daily, # 90 tab(s), 6 Refill(s), Pharmacy: [facility], 174, cm, Height/Length Dosing, 02/01/21 9:04:00 CST, 114.7, kg, Weight Dosing, 02/01/21 9:04:00 CST  cholecalciferol 1000 intl units (25 mcg) oral capsule: 1,000 IntUnit = 1 cap(s), Oral, Daily, # 90 cap(s), 5 Refill(s), Pharmacy: [facility]  [facility]acy, 174, cm, Height/Length Dosing, 02/01/21 9:04:00 CST, 114.7, kg, Weight Dosing, 02/01/21 9:04:00 CST  cloniDINE 0.3 mg oral tablet: 0.3 mg = 1 tab(s), Oral, BID, # 180 tab(s), 3 Refill(s), Pharmacy: [facility]  [facility], 174, cm, Height/Length Dosing, 02/01/21 9:04:00 CST, 114.7, kg, Weight Dosing, 02/01/21 9:04:00 CST  clopidogrel 75 mg oral tablet: 75 mg = 1 tab(s),  Oral, Daily, # 90 tab(s), 6 Refill(s), Pharmacy: [facility], 174, cm, Height/Length Dosing, 21 9:04:00 CST, 114.7, kg, Weight Dosing, 21 9:04:00 CST  donepezil 5 mg oral tablet, disintegratin mg = 1 tab(s), Oral, Once a day (at bedtime), # 90 tab(s), 4 Refill(s), Pharmacy: [facility], 174, cm, Height/Length Dosing, 21 9:04:00 CST, 114.7, kg, Weight Dosing, 21 9:04:00 CST  doxycycline hyclate 100 mg oral capsule: 100 mg = 1 cap(s), Oral, BID, X 10 day(s), # 20 cap(s), 0 Refill(s), Pharmacy: [facility], 174, cm, Height/Length Dosing, 21 10:54:00 CST, 114.9, kg, Weight Dosing, 21 10:54:00 CST  ferrous sulfate 325 mg (65 mg elemental iron) oral enteric coated tablet: 325 mg = 1 tab(s), Oral, Daily, # 90 tab(s), 3 Refill(s), Pharmacy: [facility], 174, cm, Height/Length Dosing, 21 9:04:00 CST, 114.7, kg, Weight Dosing, 21 9:04:00 CST  furosemide 20 mg oral tablet: 20 mg = 1 tab(s), Oral, Daily, # 90 tab(s), 3 Refill(s), Pharmacy: [facility], 174, cm, Height/Length Dosing, 21 9:04:00 CST, 114.7, kg, Weight Dosing, 21 9:04:00 CST  lidocaine 2% mucous membrane sol: 1 katy, TOP, QID, PRN PRN as needed for mouth sore pain, X 14 day(s), # 100 mL, 3 Refill(s), Pharmacy: [facility], 174, cm, Height/Length Dosing, 21 9:04:00 CST, 114.7, kg, Weight Dosing, 21 9:04:00 CST  losartan 50 mg oral tablet: 50 mg = 1 tab(s), Oral, Daily, # 90 tab(s), 3 Refill(s), Pharmacy: [facility], 174, cm, Height/Length Dosing, 21 9:04:00 CST, 114.7, kg, Weight Dosing, 21 9:04:00 CST  memantine 10 mg oral tablet: 10 mg = 1 tab(s), Oral, BID, # 180 tab(s), 5 Refill(s), Pharmacy: [facility], 174, cm, Height/Length Dosing, 21 9:04:00 CST, 114.7, kg, Weight Dosing, 21 9:04:00 CST  metoprolol succinate 50 mg oral tablet, extended release: 50 mg = 1 tab(s), Oral, Daily, # 90 tab(s), 3  Refill(s), Pharmacy: [facility]  [facility]armacy, 174, cm, Height/Length Dosing, 02/01/21 9:04:00 CST, 114.7, kg, Weight Dosing, 02/01/21 9:04:00 CST  sucralfate 1 g/10 mL oral suspension: 1 gm = 10 mL, Oral, QID, # 3,600 mL, 3 Refill(s), Pharmacy: [facility], 174, cm, Height/Length Dosing, 02/01/21 9:04:00 CST, 114.7, kg, Weight Dosing, 02/01/21 9:04:00 CST  venlafaxine 150 mg oral capsule, extended release: 150 mg = 1 cap(s), Oral, Daily, # 90 cap(s), 3 Refill(s), Pharmacy: [facility]  [facility]armacy, 174, cm, Height/Length Dosing, 02/01/21 9:04:00 CST, 114.7, kg, Weight Dosing, 02/01/21 9:04:00 CST.      Past Medical/ Family/ Social History   Medical history:    Active  HYPERTENSION (997.91)  Dyslipidemia (167055376)  Seizure disorder (029675937)  Expressive aphasia (309796525)  Resolved  Lactic acidosis (276.2): Onset on 4/9/2014 at 53 years.  Resolved.  Discharge planning (5893211116): Onset on 3/5/2014 at 53 years.  Resolved on 3/20/2014 at 53 years.  CVA (cerebral infarction) (434.91):  Resolved.  ACUTE MYOCARDIAL INFARCTION (410):  Resolved.  Peptic ulcer disease ([id number]):  Resolved.  Tuberculosis (68459408):  Resolved.  PEG Tube Placement (1721740017):  Resolved.  Percutaneous endoscopic gastrostomy tube externally removable (4579517741):  Resolved.  Neck repair (381034786):  Resolved.  Oropharyngeal dysphagia (551245220):  Resolved.  Uncontrolled hypertension (0211407597):  Resolved.  Rebound hypertension (737709316):  Resolved., Reviewed as documented in chart.   Surgical history:    Esophagogastroduodenoscopy on 2/12/2020 at 59 Years.  Comments:  2/12/2020 17:27 CST - Gusman Malu SCHULZ.  auto-populated from documented surgical case  Placement Of Naso Orogastric Tube on 2/12/2020 at 59 Years.  Comments:  2/12/2020 17:27 YASMINE - Malu Gusman RN.  auto-populated from documented surgical case  Esophagogastroduodenoscopy on 4/3/2019 at 58 Years.  Comments:  4/3/2019  12:15 Malu Medina RN  auto-populated from documented surgical case  Biopsy Gastrointestinal on 4/3/2019 at 58 Years.  Comments:  4/3/2019 12:15 Malu Medina RN  auto-populated from documented surgical case  Back Surgery ([id number]).  Cardiac pacemaker ([id number])., Reviewed as documented in chart.   Family history:    Primary malignant neoplasm of colon  Father  Brother  Heart disease  Father  Mother  Sister  Diabetes mellitus type 2  Father  Osteoporosis  Mother  Sister  Pacemaker care  Father  Hyperthyroidism.  Sister  Cataract.  Father  Mother  Hypertension.  Father  Brother  Peripheral vascular disease.  Father  Mother  Sister  Brother  , Reviewed as documented in chart.   Social history:    Social & Psychosocial Habits    Alcohol  08/21/2013 Risk Assessment: Denies Alcohol Use    08/24/2016  Use: Past    07/30/2018  Use: Never    Employment/School  08/24/2016  Status: Disabled    Exercise  08/24/2016  Times per week: 1-2 times/week    Comment: stretches - 08/24/2016 11:23 - Ricky Barrera    Home/Environment  08/24/2016  Lives with: Father, Mother    Nutrition/Health  08/24/2016  Type of diet: Regular    Sexual  03/12/2021  Sexually active: No    Do you think of your sexual orientation as: Straight or heterosexual    What is your current gender identity? (Check all that apply) Identifies as male    Substance Use  08/22/2013 Risk Assessment: Denies Substance Abuse    08/24/2016  Use: Never    Tobacco  08/22/2013 Risk Assessment: High Risk    03/25/2021  Use: 10 or more cigarettes (1/    Type: Cigarettes    Patient Wants Consult For Cessation Counseling No    Abuse/Neglect  03/25/2021  SHX Any signs of abuse or neglect No    Feels unsafe at home: No    Safe place to go: Yes    Spiritual/Cultural  02/01/2021  Pentecostal Preference Yarsanism    Financial/Legal Situation  02/01/2021  Financial Issues None      02/01/2021  Branch of  Never in    , Reviewed as documented in chart.   Problem list:    Active Problems (21)     Activity intolerance   ADL - activity of daily living   ADL - activity of daily living   ADL - Support while performing an activity of daily living   At risk for falls   Cognitive impairment   Difficulty using verbal communication   Difficulty using verbal communication   Dyslipidemia   Endurance   Expressive aphasia   HYPERTENSION   Impaired mobility   Impaired mobility   Obesity   Obesity   Seizure disorder   Tobacco user   Tobacco user   Type 2 diabetes mellitus   .      Physical Examination               Vital Signs   Vital Signs   3/25/2021 10:52 CDT      Temperature Oral          36.6 DegC                             Temperature Oral (calculated)             97.88 DegF                             Peripheral Pulse Rate     84 bpm                             Respiratory Rate          18 br/min                             SpO2                      97 %                             Oxygen Therapy            Room air                             Systolic Blood Pressure   100 mmHg                             Diastolic Blood Pressure  78 mmHg  .      Vital Signs (last 24 hrs)_____  Last Charted___________  Temp Oral     36.6 DegC  (MAR 25 10:52)  Heart Rate Peripheral   84 bpm  (MAR 25 10:52)  Resp Rate         18 br/min  (MAR 25 10:52)  SBP      100 mmHg  (MAR 25 10:52)  DBP      78 mmHg  (MAR 25 10:52)  SpO2      97 %  (MAR 25 10:52)  .   Basic Oxygen Information   3/25/2021 10:52 CDT      SpO2                      97 %                             Oxygen Therapy            Room air  .   General:  Alert, no acute distress.    Skin:  Intact, moist.    Head:  Normocephalic, atraumatic.    Neck:  Supple, trachea midline, no tenderness.    Eye:  Pupils are equal, round and reactive to light, extraocular movements are intact, normal conjunctiva.    Ears, nose, mouth and throat:  Tympanic membranes clear, oral mucosa moist, no  pharyngeal erythema or exudate.    Cardiovascular:  Regular rate and rhythm, No murmur, Normal peripheral perfusion.    Respiratory:  Lungs are clear to auscultation, respirations are non-labored, breath sounds are equal.    Gastrointestinal:  Soft, Nontender, Non distended, Normal bowel sounds.       Medical Decision Making   Differential Diagnosis:  Non-hemorrhagic cerebral vascular accident, hemorrhagic cerebral vascular accident, transient ischemic attack, Post ictal syndrome.    Rationale:  Patient has slurred speech, awake and alert.  No pronator drift, but more shaking of the right hand than of the left, with difficulty holding up right arm for long periods of time.  Unsure with the patient's current baseline is, but will run this as a code fast though the patient is outside of the therapeutic window for TPA.  VAN Negative.   Documents reviewed:  Emergency department nurses' notes.   Electrocardiogram:  Time 3/25/2021 11:11:00, rate 71, normal sinus rhythm, No ST-T changes, no ectopy, normal RI & QRS intervals, EP Interp.    Results review:  Lab results : Lab View   3/25/2021 13:57 CDT      COVID-19 Rapid            NEGATIVE    3/25/2021 13:08 CDT      U pH                      6.5                             U Amph Scr                Negative                             U Jessica Scr                Negative                             U Benzodia Scr            Negative                             U Cannab Scr              Negative                             U Cocaine Scr             Negative                             U Opiate Scr              Negative                             U Phencyclidine Scr       Negative    3/25/2021 11:21 CDT      Troponin-I                <0.010 ng/mL                             PTT                       26.4 second(s)                             WBC                       8.6 x10(3)/mcL                             RBC                       4.52 x10(6)/mcL                              Hgb                       13.1 gm/dL  LOW                             Hct                       41.1 %                             Platelet                  228 x10(3)/mcL                             MCV                       90.9 fL                             MCH                       29.0 pg                             MCHC                      31.9 gm/dL                             RDW                       13.6 %                             MPV                       9.5 fL                             Abs Neut                  6.05 x10(3)/mcL                             Neutro Auto               71 %  NA                             Lymph Auto                18 %  NA                             Mono Auto                 8 %  NA                             Eos Auto                  2 %  NA                             Abs Eos                   0.2 x10(3)/mcL                             Basophil Auto             0 %  NA                             Abs Neutro                6.05 x10(3)/mcL                             Abs Lymph                 1.5 x10(3)/mcL                             Abs Mono                  0.7 x10(3)/mcL                             Abs Baso                  0.0 x10(3)/mcL                             IG%                       0 %  NA                             IG#                       0.030  NA    3/25/2021 11:14 CDT      POC Sodium                138 mmol/L                             POC Potassium             3.4 mmol/L  LOW                             POC Chloride              96 mmol/L  LOW                             POC Ion Calcium           1.11 mmol/L  LOW                             POC Glucose               91 mg/dL                             POC BUN                   6.0 mg/dL  LOW                             POC Creatinine            1.2 mg/dL                             POC AGAP                  12.0  NA                             POC PT                    12.3 second(s)  HI                              POC INR                   1.0                             POC Hb                    14.3 mg/dL                             POC Hct                   42.0 %                             POC TCO2                  34.0 mmol/L  HI    3/25/2021 11:11 CDT      POC Troponin              0.00 ng/mL  .   Radiology results:  Rad Results (ST)  < 12 hrs   Accession: [id number]  Order: XR Chest 1 View  Report Dt/Tm: 03/25/2021 11:07  Report:      CLINICAL:  Acute stroke symptoms.     COMPARISON: February 1, 2021.     FINDINGS:  Cardiopericardial silhouette is within normal limits. Left  chest implanted cardiac device electrodes terminate within the right  atrium and the right ventricle. Chronic interstitial changes of the  lungs. No acute dense focal or segmental consolidation, congestion,  pleural effusion or pneumothorax.       IMPRESSION:     No acute cardiopulmonary process identified.         .      Reexamination/ Reevaluation   Time: 3/25/2021 11:15:00 .   Notes: On return of the patient from CT scan, during formal NIH stroke scale, patient appears to have improved strenght in right upper and lower extremities - mild drift of the right lower extremity, otherwise appears well.  4/5 strength right upper and lower extremities..   Time: 3/25/2021 11:40:00 .   Notes: Teleneurology has seen evaluate the patient.  Unsure if patient may be just post ictal.  Recommends giving a gram of Keppra here in the emergency room, an MRI for evaluation of any acute CVA.   .   Time: 3/25/2021 12:00:00 .   Notes: MRI is called and reports patient has a pacemaker that is not compatible with the MRI.  Will call back teleneurologist for further recommendations.  .   Time: 3/25/2021 14:00:00 .   Notes: Able to discuss case with teleneurologist.  Some delay with trying to get back in touch with her.  Currently recommend since patient is unable to undergo MRI, recommends observation admission with neuro checks, and repeating  CT scan at 24 hours. .      Procedure   NIH Stroke Scale   Time: 3/25/2021 11:15:00 .    Level of consciousness: Alert = 0.    Current month and age: Answers both correctly = 0.    Open and close eyes/ release hand: Obeys both correctly = 0.    Best gaze: Normal = 0.    Visual field testing: No visual field loss = 0.    Facial paresis: Normal symmetric movement = 0.    Motor function left arm: Normal = 0.    Motor function right arm: Normal = 0.    Motor function left leg: Normal = 0.    Motor function right leg: Drift = 1.    Limb ataxia: No ataxia = 0.    Sensory: Normal = 0.    Best language: Mild to moderate aphasia = 1.    Dysarthria: Mild to moderate slurring of words = 1.    Extinction and inattention: Normal = 0.    Total score: 3 .       Impression and Plan   Diagnosis   Weakness of right upper extremity (PBC71-UH R29.898)   Weakness of right lower extremity (PTA06-IN R29.898)      Calls-Consults   -  3/25/2021 14:20:00 , Internal Medicine, consult.    Plan   Condition: Stable.    Disposition: Admit time  3/25/2021 14:20:00, Place in Observation Telemetry Unit.    Counseled: Regarding diagnosis, Regarding diagnostic results, Regarding treatment plan, Patient indicated understanding of instructions.

## 2022-05-02 NOTE — HISTORICAL [facility] CERNER
This is a historical note converted from Laura. Formatting and pictures may have been removed.  Please reference Laura for original formatting and attached multimedia. Chief Complaint  PT WEARING MASK REPORT SENT BY N FOR EVAL OF ALTERED GAIT, ?DIZZINESS AND SOB SINCE THIS AM. ? ON EXACT TIME. PT EVAL / DR EDMONDS. TOMMY CHEN AT 10:50. ?CBG 88. PT TO CT FROM TRIAGE.  History of Present Illness  61yo M with PMH listed to right who presented to ED with worsening of RUE and RLE weakness. Patient has mild dysarthria and expressive aphasia from a previous stroke, so history was difficult to obtain. He was able to answer yes/no questions. Unsure if his information is accurate with his h/o dementia. He reported that he developed sudden worsening of his RUE and RLE weakness 20 days ago, but it has since?improved. ?When he arrived,?code fast was called in the ED, and interventional neurology reported no new changes on imaging, NIH stroke scale of 1, and pt was out of tPA window, so they recommended to continue his home antiplatelet regimen and repeat a CT in 24hrs. Medicine-on-call was consulted for admission.  ?  During my encounter, patient suddenly developed 9/10, constant. stabbing h/a throughout his entire head, which he stated was new, so CT head was obtained, and it did not show acute changes/hemorrhage.  Review of Systems  -f/ch/cp, ha, hearing loss, choking while eating, n/v, urinary complaints, c/d, runny nose, sick contacts  +chronic sob at rest, no home O2,?worsens with activity; chronic dizziness (room spins a few seconds intermittently throughout the day); chronic nonproductive cough; falls 1x/day and occasionally hits head with LOC  Physical Exam  Vitals & Measurements  T:?36.8? ?C (Oral)? TMIN:?36.6? ?C (Oral)? TMAX:?37.2? ?C (Oral)? HR:?60(Peripheral)? RR:?12? BP:?124/60? SpO2:?98%? WT:?94?kg? BMI:?30.35?  General:?NAD  Eye: EOMI  HENT:?no nasal discharge  Respiratory: BL rhonchi lower bases; 4L NC in  place  Cardiovascular: RRR, distant heart sounds;?no carotid bruit b/l; no edema b/l LE  Gastrointestinal:?soft,?non-distended, NTTP, normal BS, no rebound tenderness, no guarding  Musculoskeletal:?no obvious deformities  Integumentary:?warm, dry  Neurologic:?Alert,?decreased sensation in?R face, R arm, R leg; strength 4/5 R facial muscles; strength 3/5?RUE,?RLE;?R eye L hemifield vision loss;?mild dysarthria, expressive aphasia  Cognition and Speech: ?Speech unclear???  Psychiatric: ?Cooperative  Assessment/Plan  TIA with h/o L MCA infarct  Hypokalemia  ?  Plan:  rpt CTh tomorrow  Echo with bubble study in am  npo for now except meds until cleared by ST tomorrow  ST/OT/PT  CM consulted for?HH/NH; patient would benefit?from?rolling walker prescription  Replete electrolytes  f/u orthostatics  ?  Start low SSI  Continue home amlodipine 10 mg, atorvastatin 40 mg, clonidine 0.1 mg twice daily, Lasix 20 mg, losartan 50 mg, metoprolol succinate 50 mg  Continue home Keppra 1000 mg twice daily  Continue home memantine 10 mg twice daily, donepezil 5 mg  Continue home vitamin D 2000 units, vitamin B12 1000 mcg  Continue home pantoprazole 40 mg twice daily, sucralfate  Continue home quetiapine 200 mg qhs, venlafaxine 150 mg   Problem List/Past Medical History  PMH:  CVA 7yrs ago with residual dysarthria,?aphasia,?RUE/RLE weakness  HTN  T2DM  CAD  CHF s/p pacemaker  COPD  Anxiety  Depression  Dementia  Seizures  HLD  GERD  BALA  ?  ?   PSH:  pacemaker  ?   Medications: takes regularly. obtained from pts chart  ?  FH: CHF, htn, T2DM  ?  SH: 1ppd, no A/D  ?  Code status: DNR  Medications  Home  amlodipine 10 mg oral tablet, 10 mg= 1 tab(s), Oral, Daily, 5 refills  atorvastatin 40 mg oral tablet, 40 mg= 1 tab(s), Oral, Daily, 6 refills  cholecalciferol 1000 intl units (25 mcg) oral capsule, 1000 IntUnit= 1 cap(s), Oral, Daily, 5 refills  cloniDINE 0.3 mg oral tablet, 0.3 mg= 1 tab(s), Oral, BID, 3 refills  clopidogrel 75 mg oral  tablet, 75 mg= 1 tab(s), Oral, Daily, 6 refills  donepezil 5 mg oral tablet, disintegrating, 5 mg= 1 tab(s), Oral, Once a day (at bedtime), 4 refills  doxycycline hyclate 100 mg oral capsule, 100 mg= 1 cap(s), Oral, BID  ferrous sulfate 325 mg (65 mg elemental iron) oral enteric coated tablet, 325 mg= 1 tab(s), Oral, Daily, 3 refills  furosemide 20 mg oral tablet, 20 mg= 1 tab(s), Oral, Daily, 3 refills  Janumet XR 50 mg-500 mg oral tablet, extended release, 1 tab(s), Oral, Daily, 2 refills  Keppra 500 mg oral tablet, 1000 mg= 2 tab(s), Oral, BID, 3 refills  lidocaine 2% mucous membrane sol, 1 katy, TOP, QID, PRN, 3 refills  losartan 50 mg oral tablet, 50 mg= 1 tab(s), Oral, Daily, 3 refills  memantine 10 mg oral tablet, 10 mg= 1 tab(s), Oral, BID, 5 refills  metoprolol succinate 50 mg oral tablet, extended release, 50 mg= 1 tab(s), Oral, Daily, 3 refills  Pantoprazole 40 mg ORAL EC-Tablet, 40 mg= 1 tab(s), Oral, BID, 3 refills  ProAir HFA 90 mcg/inh inhalation aerosol with adapter, 1 puff(s), INH, q4hr, PRN, 1 refills  QUEtiapine 200 mg oral tablet, extended release, 200 mg= 1 tab(s), Oral, qPM, 5 refills  sucralfate 1 g/10 mL oral suspension, 1 gm= 10 mL, Oral, QID, 3 refills  venlafaxine 150 mg oral capsule, extended release, 150 mg= 1 cap(s), Oral, Daily, 3 refills  Vitamin B12 1000 mcg oral tablet, 1000 mcg= 1 tab(s), Oral, Daily, 5 refills  Lab Results  Test Name Test Result Date/Time   POC Potassium 3.4 mmol/L (Low) 03/25/2021 11:14 CDT   Diagnostic Results  ?  Reason For Exam  Headaches  ?   Radiology Report  CT Head W/O Contrast  ?   REASON FOR EXAM: Headaches  ?  TECHNIQUE: CT images of the head without IV contrast. Axial, coronal  and sagittal images are reviewed. Dose length product is 1196 mGycm.  Automatic exposure control, adjustment of mA/kV or iterative  reconstruction technique was used to limit radiation dose.  ?  COMPARISON: Head CT earlier today  ?  FINDINGS: No change in left MCA distribution  hypodensity. No new large  vessel territory infarct identified. No intracranial hemorrhage or  midline shift. Stable ventricles.  ?  IMPRESSION: No acute intracranial process identified.  ?  Signature Line  Electronically Signed By: Micheal Tran MD  Date/Time Signed: 03/25/2021 17:36  ?  ?  ?  ?  ?  Reason For Exam  stroke;Other (please specify)  ?  Radiology Report  ?  CLINICAL: Stroke.  ?  TECHNIQUE: Sequential axial images were performed of the brain without  contrast.  ?  Dose product length of 1345 mGycm. Automated exposure control was  utilized to minimize radiation dose.  ?  COMPARISON: November 12, 2019..  ?  FINDINGS: There is left cerebral large old infarct in the territory of  the middle cerebral artery. There is no sulcal effacement or low  attenuation changes to suggest recent large vessel territory  infarction. Chronic unchanged periventricular and subcortical white  matter low attenuation changes are present and are consistent with  chronic microangiopathic ischemia. The ventricular system and sulcal  markings prominence is consistent with atrophy. There is no acute  extra axial fluid collection. Visualized paranasal sinuses are clear  without mucosal thickening, polypoidal abnormality or air-fluid  levels. Mastoid air cells aeration is optimal.  ?  IMPRESSION:  ?  1. No acute large vessel territory infarct identified.  2. Left cerebral large old infarct in the territory of the middle  cerebral artery, chronic microvascular ischemia and atrophy.  ?  Findings were notified to the emergency room physician assistant,  Anni Mathew,? March 25, 2021 at 1114 hours.

## 2022-05-02 NOTE — HISTORICAL [facility] CERNER
This is a historical note converted from Laura. Formatting and pictures may have been removed.  Please reference Laura for original formatting and attached multimedia. Chief Complaint  EST pcp, depression, vaccine  History of Present Illness  60 Years?Black or ?Male?presents to [facility] [place] care?with PCP. ?PMH (per review of?available EMR?and confirmed with patient).  ?   Previous PCP: Cirilo  PmHx:? Stroke, HTN, DM, CAD  SHx: Pacemaker, Back surgery, Stomach surgery  FHx:? Mother: CAD, HTN,?Osteoporosis, Thyroid surgery, Cataracts  ????????? Father  of Colon Cancer in 2016, CAD, DM, HTN, MI  Social Hx:  ???? Occupation:?unemployed  ???? Nutrition: breakfast and lunch-good appetite  ???? Caffeine intake?? Coffee?all day long  ???? Alcohol intake? Denies  ???? Tobacco Intake??? 4ppd  ???? Illicit drug use? Denies  ???? Sexual history: denies  ???? Lives with? mother-  ???? Daily Exercise???? denies  ?   Complaints today: Here to establish care.  Pt presents with sister stating that he thinks about killing himself all the time lately. His?PHQ 2 and 9 were 25 prior to SADPERSON which was 8. Patient has been hospitalized at [facility] in past which has been years. Never?had?any follow-up and dose not have a psychiatrist.? Has been out of his medications for over 1 week including antidepressants.??Denies having a plan or wanting to do harm today. Is crying.  ?   Was seeing Dr. Kerr off of [facility].? Was discharged from their care due to a misunderstanding with him having another MD.? He last saw Cirilo in December.? Sister has been trying?to get him in somewhere but was unable. He does see [facility]iology[facility]. Next appointment soon has to charge pacer.  ?   Pt states he has no intention of harming himself. He does not have a suicide plan.?  Visibly short of breath but states this is not new.  +cough-years, non-productive. Smoking over 4ppd currently but only after he had stroke 12 years ago.? prior  to that he wasnt smoking as much.  hx tb as infant  toothache, left lower molar within and obvious nickolas  tearful during visit.  ?   Cancer Screening:  Colonoscopy:? 7 years ago  Lung CT screening: never had one  ?   Vaccines:  Tetanus/Tdap:? due  Flu: due  Pneumonia: did have one but unsure which one  Shingrix: refuses  Review of Systems  General: negative except as stated in hpi  Skin: negative except as stated in hpi  HEENT: negative except as stated in hpi  Respiratory: negative except as stated in hpi  CV: negative except as stated in hpi  GI: negative except as stated in hpi  : negative except as stated in hpi  MS: negative except as stated in hpi  Neuro: negative except as stated in hpi  Hematologic: negative except as stated in hpi  Endocrine: negative except as stated in hpi  Psych: negative except as stated in hpi  Physical Exam  Vitals & Measurements  T:?36.8? ?C (Oral)? HR:?92(Peripheral)? RR:?20? BP:?154/72? SpO2:?99%?  HT:?174.00?cm? WT:?114.700?kg? BMI:?37.88?  General: Alert and oriented, No acute distress.  Head: Normocephalic, Atraumatic.  Eye: Pupils are equal, round and reactive to light, Extraocular movements intact, Sclera non-icteric.  Ears/Nose/Throat:?Normal, Mucosa moist, Clear.  Neck/Thyroid: Supple, Non-tender, No carotid bruit, No lymphadenopathy, Full range of motion.  Respiratory: Clear to auscultation bilaterally, Respirations non-labored, Breath sounds equal, Symmetrical chest wall expansion, No wheezes, rales or rhonchi.  Cardiovascular: Regular rate and rhythm, No murmurs, rubs or gallops.  Gastrointestinal: Soft, Non-tender, Non-distended, Normal bowel sounds.  Musculoskeletal: Normal range of motion.  Integumentary: Warm, Dry, Intact, No suspicious lesions or rashes.  Extremities: No clubbing, cyanosis or edema.  Neurologic: No focal deficits, Cranial Nerves II-XII are grossly intact, Motor strength normal upper and lower extremities, Sensory exam intact.  Psychiatric: Normal  interaction, Coherent speech, Appropriate affect.  Assessment/Plan  1.?Immunization due?Z23  tdap today  flu? today  Ordered:  Routine Spec Collect Venipuncture - Lab blood collect, 02/01/21 10:33:00 CST, [facility], Routine, 02/01/21 10:33:00 CST, Immunization due  Dyslipidemia  Expressive aphasia  HYPERTENSION  Seizure disorder  Type 2 diabetes mellitus  Obesity  Vaccines Add, 02/01/21 10:11:00 CST, [facility], Routine, 02/01/21 10:11:00 CST, Immunization due  Dyslipidemia  Expressive aphasia  HYPERTENSION  Seizure disorder  Type 2 diabetes mellitus  Obesity  Tobacco user  HTN (hypertension)  Pain, dental  Shor...  Vaccines initial, 02/01/21 10:11:00 CST, [facility], Routine, 02/01/21 10:11:00 CST, Immunization due  Dyslipidemia  Expressive aphasia  HYPERTENSION  Seizure disorder  Type 2 diabetes mellitus  Obesity  Tobacco user  HTN (hypertension)  Pain, dental  Shor...  ?  2.?Dyslipidemia?E78.1  FLP today  Continue statin as prescribed-refilled  Follow a low cholesterol, low saturated fat diet with less than 200 mg of cholesterol a day.?  Avoid fried foods and high saturated fats (quinonez, sausage, cookies, cakes, chips, cheese, whole milk, butter, mayonnaise, creamy dressings, gravy, stew, gumbo, boudin, cracklins?and cream sauces).  Add flax seed or fish oil supplements to diet.?  Increase dietary fiber.?  Regular exercise improves cholesterol levels.  Physical activity 5 times a week for 30 minutes per day (or 150 minutes per week).?  Stressed importance of dietary modifications.  Ordered:  Routine Spec Collect Venipuncture - Lab blood collect, 02/01/21 10:33:00 CST, [facility], Routine, 02/01/21 10:33:00 CST, Immunization due  Dyslipidemia  Expressive aphasia  HYPERTENSION  Seizure disorder  Type 2 diabetes mellitus  Obesity  Vaccines Add, 02/01/21 10:11:00 CST, [facility], Routine, 02/01/21 10:11:00 CST, Immunization due  Dyslipidemia  Expressive aphasia   HYPERTENSION  Seizure disorder  Type 2 diabetes mellitus  Obesity  Tobacco user  HTN (hypertension)  Pain, dental  Shor...  Vaccines initial, 02/01/21 10:11:00 CST, [facility], Routine, 02/01/21 10:11:00 CST, Immunization due  Dyslipidemia  Expressive aphasia  HYPERTENSION  Seizure disorder  Type 2 diabetes mellitus  Obesity  Tobacco user  HTN (hypertension)  Pain, dental  Shor...  ?  3.?Expressive aphasia?R47.01  Referral to Speech Therapy  Ordered:  Routine Spec Collect Venipuncture - Lab blood collect, 02/01/21 10:33:00 CST, [facility], Routine, 02/01/21 10:33:00 CST, Immunization due  Dyslipidemia  Expressive aphasia  HYPERTENSION  Seizure disorder  Type 2 diabetes mellitus  Obesity  Vaccines Add, 02/01/21 10:11:00 CST, [facility], Routine, 02/01/21 10:11:00 CST, Immunization due  Dyslipidemia  Expressive aphasia  HYPERTENSION  Seizure disorder  Type 2 diabetes mellitus  Obesity  Tobacco user  HTN (hypertension)  Pain, dental  Shor...  Vaccines initial, 02/01/21 10:11:00 CST, [facility], Routine, 02/01/21 10:11:00 CST, Immunization due  Dyslipidemia  Expressive aphasia  HYPERTENSION  Seizure disorder  Type 2 diabetes mellitus  Obesity  Tobacco user  HTN (hypertension)  Pain, dental  Shor...  ?  4.?HYPERTENSION?I10,?HTN (hypertension)?I10,?HTN (hypertension)?I10  Refill all medications today, has been out of meds?x 1 week.  Ordered:  amLODIPine, 10 mg = 1 tab(s), Oral, Daily, # 90 tab(s), 5 Refill(s), Pharmacy: [facility], 174, cm, Height/Length Dosing, 02/01/21 9:04:00 CST, 114.7, kg, Weight Dosing, 02/01/21 9:04:00 CST  losartan, 50 mg = 1 tab(s), Oral, Daily, # 90 tab(s), 3 Refill(s), Pharmacy: [facility], 174, cm, Height/Length Dosing, 02/01/21 9:04:00 CST, 114.7, kg, Weight Dosing, 02/01/21 9:04:00 CST  metoprolol, 50 mg = 1 tab(s), Oral, Daily, # 90 tab(s), 3 Refill(s), Pharmacy: [facility], 174, cm, Height/Length Dosing, 02/01/21  9:04:00 CST, 114.7, kg, Weight Dosing, 02/01/21 9:04:00 CST  Routine Spec Collect Venipuncture - Lab blood collect, 02/01/21 10:33:00 CST, [facility], Routine, 02/01/21 10:33:00 CST, Immunization due  Dyslipidemia  Expressive aphasia  HYPERTENSION  Seizure disorder  Type 2 diabetes mellitus  Obesity  Vaccines Add, 02/01/21 10:11:00 CST, [facility], Routine, 02/01/21 10:11:00 CST, Immunization due  Dyslipidemia  Expressive aphasia  HYPERTENSION  Seizure disorder  Type 2 diabetes mellitus  Obesity  Tobacco user  HTN (hypertension)  Pain, dental  Shor...  Vaccines initial, 02/01/21 10:11:00 CST, [facility], Routine, 02/01/21 10:11:00 CST, Immunization due  Dyslipidemia  Expressive aphasia  HYPERTENSION  Seizure disorder  Type 2 diabetes mellitus  Obesity  Tobacco user  HTN (hypertension)  Pain, dental  Shor...  ?  5.?Seizure disorder?G40.409  No seizures in years, refilled medication today  Ordered:  Routine Spec Collect Venipuncture - Lab blood collect, 02/01/21 10:33:00 CST, [facility], Routine, 02/01/21 10:33:00 CST, Immunization due  Dyslipidemia  Expressive aphasia  HYPERTENSION  Seizure disorder  Type 2 diabetes mellitus  Obesity  Vaccines Add, 02/01/21 10:11:00 CST, [facility], Routine, 02/01/21 10:11:00 CST, Immunization due  Dyslipidemia  Expressive aphasia  HYPERTENSION  Seizure disorder  Type 2 diabetes mellitus  Obesity  Tobacco user  HTN (hypertension)  Pain, dental  Shor...  Vaccines initial, 02/01/21 10:11:00 CST, [facility], Routine, 02/01/21 10:11:00 CST, Immunization due  Dyslipidemia  Expressive aphasia  HYPERTENSION  Seizure disorder  Type 2 diabetes mellitus  Obesity  Tobacco user  HTN (hypertension)  Pain, dental  Shor...  ?  6.?Type 2 diabetes mellitus?E11.9  Continue medications as prescribed.  Follow ADA diet.  Avoid soda, simple sweets, and limit rice/pasta/bread/starches and consume brown options when possible.?  Maintain  healthy weight with BMI goal <30.?  Perform aerobic exercise for 150 minutes per week (or 5 days a week for 30 minutes each day).?  Examine feet daily.?  Obtain annual dilated eye exam.  Eye exam: Sister looking for eye doctor, sent for Fundus today  Foot exam:? at next OV  Ordered:  Routine Spec Collect Venipuncture - Lab blood collect, 02/01/21 10:33:00 CST, [facility], Routine, 02/01/21 10:33:00 CST, Immunization due  Dyslipidemia  Expressive aphasia  HYPERTENSION  Seizure disorder  Type 2 diabetes mellitus  Obesity  Vaccines Add, 02/01/21 10:11:00 CST, [facility], Routine, 02/01/21 10:11:00 CST, Immunization due  Dyslipidemia  Expressive aphasia  HYPERTENSION  Seizure disorder  Type 2 diabetes mellitus  Obesity  Tobacco user  HTN (hypertension)  Pain, dental  Shor...  Vaccines initial, 02/01/21 10:11:00 CST, [facility], Routine, 02/01/21 10:11:00 CST, Immunization due  Dyslipidemia  Expressive aphasia  HYPERTENSION  Seizure disorder  Type 2 diabetes mellitus  Obesity  Tobacco user  HTN (hypertension)  Pain, dental  Shor...  ?  7.?Obesity?E66.8  BMI??37.88.? Goal BMI less than 30.  Aerobic exercise 150min/week  Avoid soda, simple sugars, saturated fats and processed foods, sweets, excessive rice, pasta, potatoes, bread  Limit fast food  Eat plenty of fresh fruits and vegetables  Ordered:  Routine Spec Collect Venipuncture - Lab blood collect, 02/01/21 10:33:00 CST, [facility], Routine, 02/01/21 10:33:00 CST, Immunization due  Dyslipidemia  Expressive aphasia  HYPERTENSION  Seizure disorder  Type 2 diabetes mellitus  Obesity  Vaccines Add, 02/01/21 10:11:00 CST, [facility], Routine, 02/01/21 10:11:00 CST, Immunization due  Dyslipidemia  Expressive aphasia  HYPERTENSION  Seizure disorder  Type 2 diabetes mellitus  Obesity  Tobacco user  HTN (hypertension)  Pain, dental  Shor...  Vaccines initial, 02/01/21 10:11:00 CST, [facility], Routine, 02/01/21  10:11:00 CST, Immunization due  Dyslipidemia  Expressive aphasia  HYPERTENSION  Seizure disorder  Type 2 diabetes mellitus  Obesity  Tobacco user  HTN (hypertension)  Pain, dental  Shor...  ?  8.?Tobacco user?Z72.0  Smoking cessation discussed.?  Pt not ready to quit.  Discussed benefits of quitting including improved health, decreased cardiac/vascular/pulmonary/stroke risks as well as saving money.  Ordered:  Referral to Lung Cancer Screening, 4 ppd smoker-need screening, Shortness of breath  Tobacco user  Vaccines Add, 02/01/21 10:11:00 CST, [facility], Routine, 02/01/21 10:11:00 CST, Immunization due  Dyslipidemia  Expressive aphasia  HYPERTENSION  Seizure disorder  Type 2 diabetes mellitus  Obesity  Tobacco user  HTN (hypertension)  Pain, dental  Shor...  Vaccines initial, 02/01/21 10:11:00 CST, [facility], Routine, 02/01/21 10:11:00 CST, Immunization due  Dyslipidemia  Expressive aphasia  HYPERTENSION  Seizure disorder  Type 2 diabetes mellitus  Obesity  Tobacco user  HTN (hypertension)  Pain, dental  Shor...  ?  9.?Shortness of breath?R06.02  CXR today  Ordered:  Referral to Lung Cancer Screening, 4 ppd smoker-need screening, Shortness of breath  Tobacco user  Vaccines Add, 02/01/21 10:11:00 CST, [facility], Routine, 02/01/21 10:11:00 CST, Immunization due  Dyslipidemia  Expressive aphasia  HYPERTENSION  Seizure disorder  Type 2 diabetes mellitus  Obesity  Tobacco user  HTN (hypertension)  Pain, dental  Shor...  Vaccines initial, 02/01/21 10:11:00 CST, [facility], Routine, 02/01/21 10:11:00 CST, Immunization due  Dyslipidemia  Expressive aphasia  HYPERTENSION  Seizure disorder  Type 2 diabetes mellitus  Obesity  Tobacco user  HTN (hypertension)  Pain, dental  Shor...  ?  10.?Coronary artery disease?I25.10  Keep all appt with [facility]  Continue daily Aspirin.  Obtain records from [facility]  Stressed importance of adequate LDL, HA1C, and BP control.  Discussed  appropriate lifestyle changes including smoking cessation, exercise, weight loss, and dietary modifications.  ED precautions reviewed including SOB, LOERA, chest pain, dizziness, near syncope, palpitations, or jaw/back/neck discomfort.?  Ordered:  furosemide, 20 mg = 1 tab(s), Oral, Daily, # 90 tab(s), 3 Refill(s), Pharmacy: [facility], 174, cm, Height/Length Dosing, 02/01/21 9:04:00 CST, 114.7, kg, Weight Dosing, 02/01/21 9:04:00 CST  ?  11.?GERD (gastroesophageal reflux disease)?K21.9  Continue Pantoprazole 40mg?BID  Refilled prescription  Avoid spicy, acidic, fried foods and alcohol.  Eat 2-3 hours before going to bed.  Avoid tight clothing, chew food thoroughly.  Reduce caffeine intake, avoid soda.  Stressed importance of Smoking/Tobacco Cessation.  Ordered:  pantoprazole, 40 mg = 1 tab(s), Oral, BID, # 180 tab(s), 3 Refill(s), Pharmacy: [facility], 174, cm, Height/Length Dosing, 02/01/21 9:04:00 CST, 114.7, kg, Weight Dosing, 02/01/21 9:04:00 CST  ?  12.?Dental abscess?K04.7  Clindamycin 300mg po TID? x 10 days  Lidocaine 2% topically QID prn pain  Ordered:  clindamycin, 300 mg = 1 cap(s), Oral, q8hr, X 10 day(s), # 30 cap(s), 0 Refill(s), Pharmacy: [facility], 174, cm, Height/Length Dosing, 02/01/21 9:04:00 CST, 114.7, kg, Weight Dosing, 02/01/21 9:04:00 CST  lidocaine topical, 1 katy, TOP, QID, PRN PRN as needed for mouth sore pain, X 14 day(s), # 100 mL, 3 Refill(s), Pharmacy: [facility], 174, cm, Height/Length Dosing, 02/01/21 9:04:00 CST, 114.7, kg, Weight Dosing, 02/01/21 9:04:00 CST  ?  13.?Insomnia?G47.00  Refilled Quetiapine 200mg today  Avoid caffeine, alcohol?and stimulants. Do not use illicit drugs.  Practice positive phrases and repeat throughout the day.  Try?yoga,?lavender scents or Chamomile tea to promote relaxation.  Set healthy boundaries, avoid people and conversations that increase stress.  Avoid caffeinated beverages after lunch  Avoid  alcohol near bedtime (eg, late afternoon and evening)  Avoid smoking or other nicotine intake, particularly during the evening  Exercise regularly for at least 20 minutes, preferably more than four to five hours prior to bedtime  Avoid daytime naps, especially if they are longer than 20 to 30 minutes or occur late in the day  Resolve concerns or worries before bedtime  Try not to force sleep  ?   Sleep Hygiene Techniques: Sleep hygiene refers to actions that tend to improve and maintain good sleep  Power down electronic devices at least one hour prior to bedtime.  Keep room dark; use eye mask or relaxation sound machine to promote rest.  Sleep as long as necessary to feel rested (usually seven to eight hours for adults) and then get out of bed  Maintain a regular sleep schedule, particularly a regular wake-up time in the morning  Ordered:  QUEtiapine, 200 mg = 1 tab(s), Oral, qPM, # 90 tab(s), 5 Refill(s), Pharmacy: [facility], 174, cm, Height/Length Dosing, 02/01/21 9:04:00 CST, 114.7, kg, Weight Dosing, 02/01/21 9:04:00 CST  ?  14.?PUD (peptic ulcer disease)?K27.9  Continue?Carafate  Report any bleeding symptoms, blood in stool that is dark red  Ordered:  sucralfate, 1 gm = 10 mL, Oral, QID, # 3,600 mL, 0 Refill(s), Pharmacy: [facility], 174, cm, Height/Length Dosing, 02/01/21 9:04:00 CST, 114.7, kg, Weight Dosing, 02/01/21 9:04:00 CST  Request Colonoscopy Results, 02/01/21 13:16:00 CST, PUD (peptic ulcer disease)  ?  Orders:  atorvastatin, 40 mg = 1 tab(s), Oral, Daily, # 90 tab(s), 6 Refill(s), Pharmacy: [facility], 174, cm, Height/Length Dosing, 02/01/21 9:04:00 CST, 114.7, kg, Weight Dosing, 02/01/21 9:04:00 CST  cholecalciferol, 1,000 IntUnit = 1 cap(s), Oral, Daily, # 90 cap(s), 5 Refill(s), Pharmacy: [facility], 174, cm, Height/Length Dosing, 02/01/21 9:04:00 CST, 114.7, kg, Weight Dosing, 02/01/21 9:04:00 CST  cloNIDine, 0.3 mg = 1 tab(s), Oral, BID, # 180 tab(s), 3 Refill(s),  Pharmacy: [facility], 174, cm, Height/Length Dosing, 02/01/21 9:04:00 CST, 114.7, kg, Weight Dosing, 02/01/21 9:04:00 CST  clopidogrel, 75 mg = 1 tab(s), Oral, Daily, # 90 tab(s), 6 Refill(s), Pharmacy: [facility], 174, cm, Height/Length Dosing, 02/01/21 9:04:00 CST, 114.7, kg, Weight Dosing, 02/01/21 9:04:00 CST  cyanocobalamin, 1,000 mcg = 1 tab(s), Oral, Daily, # 90 tab(s), 5 Refill(s), Pharmacy: [facility], 174, cm, Height/Length Dosing, 02/01/21 9:04:00 CST, 114.7, kg, Weight Dosing, 02/01/21 9:04:00 CST  cyclobenzaprine, 10 mg = 1 tab(s), Oral, TID, X 14 day(s), # 42 tab(s), 1 Refill(s), Pharmacy: [facility], 174, cm, Height/Length Dosing, 02/01/21 9:04:00 CST, 114.7, kg, Weight Dosing, 02/01/21 9:04:00 CST  donepezil, 5 mg = 1 tab(s), Oral, Once a day (at bedtime), # 90 tab(s), 4 Refill(s), Pharmacy: [facility], 174, cm, Height/Length Dosing, 02/01/21 9:04:00 CST, 114.7, kg, Weight Dosing, 02/01/21 9:04:00 CST  ferrous sulfate, 325 mg = 1 tab(s), Oral, Daily, # 90 tab(s), 3 Refill(s), Pharmacy: [facility], 174, cm, Height/Length Dosing, 02/01/21 9:04:00 CST, 114.7, kg, Weight Dosing, 02/01/21 9:04:00 CST  levETIRAcetam, 1,000 mg = 2 tab(s), Oral, BID, # 360 tab(s), 3 Refill(s), Pharmacy: [facility], 174, cm, Height/Length Dosing, 02/01/21 9:04:00 CST, 114.7, kg, Weight Dosing, 02/01/21 9:04:00 CST  memantine, 10 mg = 1 tab(s), Oral, BID, # 180 tab(s), 5 Refill(s), Pharmacy: [facility], 174, cm, Height/Length Dosing, 02/01/21 9:04:00 CST, 114.7, kg, Weight Dosing, 02/01/21 9:04:00 CST  metformin-sitagliptin, 1 tab(s), Oral, Daily, # 90 tab(s), 2 Refill(s), Pharmacy: [facility], 174, cm, Height/Length Dosing, 02/01/21 9:04:00 CST, 114.7, kg, Weight Dosing, 02/01/21 9:04:00 CST  venlafaxine, 150 mg = 1 cap(s), Oral, Daily, # 90 cap(s), 3 Refill(s), Pharmacy: [facility], 174, cm, Height/Length Dosing, 02/01/21 9:04:00 CST, 114.7, kg, Weight  Dosing, 02/01/21 9:04:00 CST  Clinic Follow up, *Est. 03/01/21 8:10:00 CST, Order for future visit, History of CVA in adulthood, [facility]  Clinic Follow up, *Est. 02/08/21 3:00:00 CST, Order for future visit, Major depression, [facility]  Occult Blood Fecal Immunoassay, Routine collect, Stool, Order for future visit, *Est. 02/08/21 3:00:00 CST, *Est. Stop date 02/08/21 3:00:00 CST, Nurse collect, Encounter for wellness examination  Office/Outpatient Visit Level 4 Established [id number] PC, Hyperlipidemia  Cardiac pacemaker  Vitamin B 12 deficiency  Muscle cramps  History of CVA in adulthood, [facility], 02/01/21 9:47:00 CST  Vitamin D, 25-Hydroxy Level, Routine collect, 02/01/21 10:26:00 CST, Blood, Stop date 02/01/21 10:26:00 CST, Lab Collect, Venous Draw, Hyperlipidemia  Cardiac pacemaker  Muscle cramps  Vitamin B 12 deficiency  History of CVA in adulthood, 02/01/21 9:46:00 CST  Referrals  [facility] Referral to Ophthalmology [facility], Specialty: Fundus Exam, Reason: Fundus Exam, Refer To: Referral, Fundus Exam, [facility], [address] [place] [address]., Start: 02/01/21 9:58:00 CST  [facility] Internal Referral to Speech Therapy, Specialty: Speech & Language Pathology, Reason: hx cva; expressive aphasia, Start: 02/01/21 13:04:00 CST, Speech/Language/Cognition, Evaluation and Treatment, History of CVA in adulthood  Clinic Follow up, *Est. 03/01/21 8:10:00 CST, Order for future visit, History of CVA in adulthood, [facility]  Clinic Follow up, *Est. 02/08/21 3:00:00 CST, Order for future visit, Major depression, [facility]  Referral to Lung Cancer Screening, 4 ppd smoker-need screening, Shortness of breath  Tobacco user   Problem List/Past Medical History  Ongoing  Dyslipidemia  Expressive aphasia  HYPERTENSION  Obesity  Obesity  Seizure disorder  Tobacco user  Type 2 diabetes mellitus  Historical  ACUTE MYOCARDIAL  INFARCTION  CVA (cerebral infarction)  Discharge planning  Lactic acidosis  Neck repair  Oropharyngeal dysphagia  PEG Tube Placement  Peptic ulcer disease  Percutaneous endoscopic gastrostomy tube externally removable  Rebound hypertension  Tuberculosis  Uncontrolled hypertension  Procedure/Surgical History  Esophagogastroduodenoscopy (02/12/2020)  Inspection of Upper Intestinal Tract, Via Natural or Artificial Opening Endoscopic (02/12/2020)  Placement Of Naso Orogastric Tube (02/12/2020)  Insertion of Infusion Device into Superior Vena Cava, Percutaneous Approach (02/11/2020)  Biopsy Gastrointestinal (04/03/2019)  Esophagogastroduodenoscopy (04/03/2019)  Excision of Esophagus, Via Natural or Artificial Opening Endoscopic, Diagnostic (04/03/2019)  Excision of Stomach, Pylorus, Via Natural or Artificial Opening Endoscopic, Diagnostic (04/03/2019)  Insertion of Pacemaker Lead into Right Atrium, Percutaneous Approach (09/21/2017)  Insertion of Pacemaker Lead into Right Ventricle, Percutaneous Approach (09/21/2017)  Insertion of Pacemaker, Dual Chamber into Chest Subcutaneous Tissue and Fascia, Open Approach (09/21/2017)  Fluoroscopy of Left Heart using Other Contrast (09/20/2017)  Fluoroscopy of Multiple Coronary Arteries using Other Contrast (09/20/2017)  Measurement of Cardiac Sampling and Pressure, Left Heart, Percutaneous Approach (09/20/2017)  Performance of Cardiac Pacing, Continuous (09/20/2017)  Fluoroscopy of Multiple Coronary Arteries using Low Osmolar Contrast (07/05/2017)  Fluoroscopy of Pelvic Arteries using Low Osmolar Contrast (07/05/2017)  Fluoroscopy of Right Heart using Low Osmolar Contrast (07/05/2017)  Measurement of Cardiac Sampling and Pressure, Left Heart, Percutaneous Approach (07/05/2017)  Central Venous Catheter Placement with Guidance (04/14/2014)  Continuous invasive mechanical ventilation for less than 96 consecutive hours (04/09/2014)  Insertion of endotracheal tube (04/09/2014)  Magnetic  resonance imaging of brain and brain stem (02/28/2014)  Magnetic resonance imaging of other and unspecified sites (02/28/2014)  Injection or infusion of thrombolytic agent (02/27/2014)  HOSPITAL OBSERVATION SERVICE, PER HOUR (09/25/2013)  HOSPITAL OBSERVATION SERVICE, PER HOUR (08/21/2013)  Back Surgery  Cardiac pacemaker   Medications  amlodipine 10 mg oral tablet, 10 mg= 1 tab(s), Oral, Daily, 5 refills  atorvastatin 40 mg oral tablet, 40 mg= 1 tab(s), Oral, Daily, 6 refills  cholecalciferol 1000 intl units (25 mcg) oral capsule, 1000 IntUnit= 1 cap(s), Oral, Daily, 5 refills  clindamycin 300 mg oral capsule, 300 mg= 1 cap(s), Oral, q8hr  cloniDINE 0.3 mg oral tablet, 0.3 mg= 1 tab(s), Oral, BID, 3 refills  clopidogrel 75 mg oral tablet, 75 mg= 1 tab(s), Oral, Daily, 6 refills  cyclobenzaprine 10 mg oral tablet, 10 mg= 1 tab(s), Oral, TID, 1 refills  Dilaudid 2 mg/mL injectable solution, 1 mg= 0.5 mL, IV Push, Once  donepezil 5 mg oral tablet, disintegrating, 5 mg= 1 tab(s), Oral, Once a day (at bedtime), 4 refills  ferrous sulfate 325 mg (65 mg elemental iron) oral enteric coated tablet, 325 mg= 1 tab(s), Oral, Daily, 3 refills  furosemide 20 mg oral tablet, 20 mg= 1 tab(s), Oral, Daily, 3 refills  Janumet XR 50 mg-500 mg oral tablet, extended release, 1 tab(s), Oral, Daily, 2 refills  Keppra 500 mg oral tablet, 1000 mg= 2 tab(s), Oral, BID, 3 refills  lidocaine 2% mucous membrane sol, 1 katy, TOP, QID, PRN, 3 refills  losartan 50 mg oral tablet, 50 mg= 1 tab(s), Oral, Daily, 3 refills  memantine 10 mg oral tablet, 10 mg= 1 tab(s), Oral, BID, 5 refills  metoprolol succinate 50 mg oral tablet, extended release, 50 mg= 1 tab(s), Oral, Daily, 3 refills  Pantoprazole 40 mg ORAL EC-Tablet, 40 mg= 1 tab(s), Oral, BID, 3 refills  QUEtiapine 200 mg oral tablet, extended release, 200 mg= 1 tab(s), Oral, qPM, 5 refills  sucralfate 1 g/10 mL oral suspension, 1 gm= 10 mL, Oral, QID  venlafaxine 150 mg oral capsule, extended  release, 150 mg= 1 cap(s), Oral, Daily, 3 refills  Vitamin B12 1000 mcg oral tablet, 1000 mcg= 1 tab(s), Oral, Daily, 5 refills  Allergies  aspirin?(Rash, unknown)  contrast media (iodine-based)?(throat closes, unknown)  hydrALAZINE?(Rash, unknown, Tachycardia)  iodine?(Rash, unknown)  penicillin?(rash)  Social History  Abuse/Neglect  No, No, Yes, 02/01/2021  Alcohol - Denies Alcohol Use, 08/21/2013  Never, 07/30/2018  Past, 08/24/2016  Employment/School  Disabled, 08/24/2016  Exercise  Exercise frequency: 1-2 times/week., 08/24/2016  Financial/Legal Situation  None, 02/01/2021  Home/Environment  Lives with Father, Mother., 08/24/2016    Never in , 02/01/2021  Nutrition/Health  Regular, 08/24/2016  Spiritual/Cultural  Yarsanism, 02/01/2021  Substance Use - Denies Substance Abuse, 08/22/2013  Never, 08/24/2016  Tobacco - High Risk, 08/22/2013  10 or more cigarettes (1/2 pack or more)/day in last 30 days, Cigarettes, Yes, 02/01/2021  Family History  Cataract.: Mother and Father.  Diabetes mellitus type 2: Father.  Heart disease: Mother, Father and Sister.  Hypertension.: Father and Brother.  Hyperthyroidism.: Sister.  Osteoporosis: Mother and Sister.  Pacemaker care: Father.  Peripheral vascular disease.: Mother, Father, Sister and Brother.  Primary malignant neoplasm of colon: Father and Brother.  Immunizations  Vaccine Date Status   influenza virus vaccine, inactivated 02/01/2021 Given   tetanus/diphtheria/pertussis, acel(Tdap) 02/01/2021 Given   influenza virus vaccine, inactivated 11/15/2019 Given   influenza virus vaccine, inactivated 01/24/2019 Recorded   influenza virus vaccine, inactivated 09/19/2017 Given   influenza virus vaccine, inactivated 03/02/2014 Given   pneumococcal 23-polyvalent vaccine 03/02/2014 Given   Health Maintenance  Health Maintenance  ???Pending?(in the next year)  ??? ??Due?  ??? ? ? ?Diabetes Maintenance-Foot Exam due??02/01/21??Unknown Frequency  ??? ? ? ?Zoster Vaccine  due??02/01/21??Unknown Frequency  ??? ??Refused?  ??? ? ? ?Smoking Cessation due??01/01/21??and every 1??year(s)  ??? ??Due In Future?  ??? ? ? ?Colorectal Screening not due until??02/11/21??and every 1??year(s)  ??? ? ? ?Diabetes Maintenance-HgbA1c not due until??03/31/21??and every 1??year(s)  ??? ? ? ?Medicare Annual Wellness Exam not due until??03/31/21??and every 1??year(s)  ??? ? ? ?Influenza Vaccine not due until??10/01/21??and every 1??day(s)  ??? ? ? ?Diabetes Maintenance-Medication Prescribed not due until??11/11/21??and every 1??year(s)  ??? ? ? ?ADL Screening not due until??11/16/21??and every 1??year(s)  ??? ? ? ?Obesity Screening not due until??01/01/22??and every 1??year(s)  ??? ? ? ?Alcohol Misuse Screening not due until??01/02/22??and every 1??year(s)  ???Satisfied?(in the past 1 year)  ??? ??Satisfied?  ??? ? ? ?ADL Screening on??11/16/20.??Satisfied by Geeta Mccollum  ??? ? ? ?Alcohol Misuse Screening on??02/01/21.??Satisfied by Corry Branch LPN.  ??? ? ? ?Blood Pressure Screening on??02/01/21.??Satisfied by Corry Branch LPN  ??? ? ? ?Body Mass Index Check on??02/01/21.??Satisfied by Corry Branch LPN.  ??? ? ? ?Colorectal Screening on??02/12/20.??Satisfied by Vandana Crawford  ??? ? ? ?Coronary Artery Disease Maintenance-Antiplatelet Agent Prescribed on??02/01/21.??Satisfied by Eva Oneal  ??? ? ? ?Coronary Artery Disease Maintenance-Lipid Lowering Therapy on??02/01/21.??Satisfied by Eva Oneal  ??? ? ? ?Depression Screening on??02/01/21.??Satisfied by Corry Branch LPN.  ??? ? ? ?Diabetes Maintenance-Fasting Lipid Profile on??02/01/21.??Satisfied by Kelsy Ritchie  ??? ? ? ?Diabetes Maintenance-Serum Creatinine on??02/01/21.??Satisfied by Kelsy Ritchie  ??? ? ? ?Diabetes Maintenance-Microalbumin on??02/01/21.??Satisfied by Kelsy Ritchie  ??? ? ? ?Diabetes Maintenance-Medication Prescribed on??11/11/20.??Satisfied by Mila  Geeta DOYLE  ??? ? ? ?Diabetes Maintenance-HgbA1c on??03/31/20.??Satisfied by Coleen Molina  ??? ? ? ?Diabetes Screening on??02/01/21.??Satisfied by Kelsy Ritchie  ??? ? ? ?Hypertension Management-BMP on??02/01/21.??Satisfied by Kelsy Ritchie  ??? ? ? ?Hypertension Management-Blood Pressure on??02/01/21.??Satisfied by Corry Branch LPN.  ??? ? ? ?Influenza Vaccine on??02/01/21.??Satisfied by Corry Branch LPN.  ??? ? ? ?Lipid Screening on??02/01/21.??Satisfied by Kelsy Ritchie  ??? ? ? ?Medicare Annual Wellness Exam on??03/31/20.??Satisfied by Kvng Kerr MD  ??? ? ? ?Obesity Screening on??02/01/21.??Satisfied by Corry Branch LPN.  ??? ? ? ?Smoking Cessation on??11/11/20.??Satisfied by Geeta Mccollum  ??? ? ? ?Tetanus Vaccine on??02/01/21.??Satisfied by Corry Branch LPN.  ??? ??Refused?  ??? ? ? ?Smoking Cessation on??02/01/21.??Recorded by Eva Oneal??Reason: Patient Refuses  ??? ? ? ?Tetanus Vaccine on??11/16/20.??Recorded by Geeta Mccollum  ??? ? ? ?Zoster Vaccine on??02/01/21.??Recorded by Eva Oneal??Reason: Patient Refuses  ?

## 2022-05-02 NOTE — HISTORICAL [facility] CERNER
This is a historical note converted from Laura. Formatting and pictures may have been removed.  Please reference Laura for original formatting and attached multimedia. Chief Complaint  Routine follow up. No c/o today.  History of Present Illness  5/14/2021:? Here today for follow-up.  Last seen in February and [facility] wellness in March.??He was hospitalized in March and all BP medications were stopped at that time except Metoprolol.? Since that time, home health has been monitoring him and his BP have remained high.? I restarted his Losartan at 50mg. Today his BP is 130/90 manually.? Last labs in hospital in March. Kidneys functioning good. He is taking Metoprolol 50mg po daily and Losartan 50mg po daily. Is also on Lasix 40mg po daily, Iron, Plavix, Atorvastatin, Donepezil, Memantine.  Sister states he is drinking about 8 cups of coffee daily.? He is going to the bathroom frequently.? No trouble with stream reported.  Depression/Anxiety: Unable to get into [place] yet.  Is out of Potassium pills.?  Need to resend order for CT lung. Did not turn in his FIT test.  [facility] appt in July to check pacer.  Denies complaints today  Last hgbA1c POC in march was 5.6.  Does not check glucose at home.  Did do speech therapy at home by Home Health.? STAT coming to d/c him this Saturday, Will need continued surveillance of his BP as I am increasing his Losartan today.  ?   3/12/2021:?Medicare Annual Wellness Visit.  Upcoming Telehealth visit with [facility]-depression  Last PCP visit, 2/26, no show.?  FIT Kit pending since last OV  Lung Screening order dissociated due to lack of contact  Refuses Shingles vaccine today  Smoking 2 ppd  ?   2/1/2021:? Complaints today: Here to establish care.  Pt presents with sister stating that he thinks about killing himself all the time lately. His?PHQ 2 and 9 were 25 prior to SADPERSON which was 8. Patient has been hospitalized at [facility] in past which has been years. Never?had?any follow-up  and dose not have a psychiatrist.? Has been out of his medications for over 1 week including antidepressants.??Denies having a plan or wanting to do harm today. Is crying.  Was seeing Dr. Kerr off of [facility].? Was discharged from their care due to a misunderstanding with him having another MD.? He last saw [facility] in December.? Sister has been trying?to get him in somewhere but was unable. He does see Cardiology-[facility]. Next appointment soon has to charge pacer.  Pt states he has no intention of harming himself. He does not have a suicide plan.?  Visibly short of breath but states this is not new.  +cough-years, non-productive. Smoking over 4ppd currently but only after he had stroke 12 years ago.? prior to that he wasnt smoking as much.  hx tb as infant  toothache, left lower molar within and obvious nickolas  tearful during visit.  Cancer Screening:  Colonoscopy:? 7 years ago  Lung CT screening: never had one  Vaccines:  Tetanus/Tdap:? due  Flu: due  Pneumonia: did have one but unsure which one  Shingrix: refuses  Review of Systems  General: negative except as stated in hpi  Skin: negative except as stated in hpi  HEENT: negative except as stated in hpi  Respiratory: negative except as stated in hpi  CV: negative except as stated in hpi  GI: negative except as stated in hpi  : negative except as stated in hpi  MS: negative except as stated in hpi  Neuro: negative except as stated in hpi  Hematologic: negative except as stated in hpi  Endocrine: negative except as stated in hpi  Psych: negative except as stated in hpi  Physical Exam  Vitals & Measurements  T:?36.9? ?C (Oral)? HR:?91(Peripheral)? RR:?20? BP:?130/90? SpO2:?98%?  HT:?176.00?cm? WT:?119.600?kg? BMI:?38.61?  General: Well-developed, well-nourished, conscious and coherent, in NAD  VS: reviewed  Skin: w/d without rash, good texture and turgor  Head: NC/AT  Eyes: Sclera and conjunctiva normal, PERRLA, EOMI  Neck: supple without meningismus or adenopathy.  Carotids are equal. Trachea midline. No bruits or JVD.  Chest: normal AP diameter. Good expansion without retractions. Nontender. Lungs diminished posteriorly. +wheezing anteriorly. +LOERA  Heart: RRR. Tones normal. No m/r/g. 1+peripheral edema. Pulses 2+. +edema to bilateral hands  Neuro:? AAOx4. GCS 15.?LADD. Speech clear. Normal gait  Assessment/Plan  1.?Dyslipidemia?E78.1  2/2021:? LDL?72?/ Trig? 106? / HDL?42 / Chol 135?.  Continue statin as prescribed.?  Follow a low cholesterol, low saturated fat diet with less than 200 mg of cholesterol a day.?  Avoid fried foods and high saturated fats (quinonez, sausage, cookies, cakes, chips, cheese, whole milk, butter, mayonnaise, creamy dressings, gravy, stew, gumbo, boudin, cracklins?and cream sauces).  Add flax seed or fish oil supplements to diet.?  Increase dietary fiber.?  Regular exercise improves cholesterol levels.  Physical activity 5 times a week for 30 minutes per day (or 150 minutes per week).?  Stressed importance of dietary modifications.  Ordered:  1160F- Medication reconciliation completed during visit, HYPERTENSION  Tobacco use  Peripheral edema  Dyslipidemia  Expressive aphasia  Obesity  Seizure disorder, [facility] [employer] C, 05/14/21 10:13:00 CDT  3011F- Lipid panel results documented and reviewed (must include total, HDL, Trig, & LDL), Dyslipidemia  Type 2 diabetes mellitus, [facility], 05/14/21 10:43:00 CDT  Clinic Follow up, *Est. 08/10/21 14:20:00 CDT, Order for future visit, Seizure disorder, [facility]  Office/Outpatient Visit Level 4 Established [id number] PC, HYPERTENSION  Tobacco use  Peripheral edema  Dyslipidemia  Expressive aphasia  Obesity  Seizure disorder, [facility], 05/14/21 10:12:00 CDT  ?  2.?Expressive aphasia?R47.01  QSpeech therapy working with patient  Neurology appt 7/2021  Ordered:  1160F- Medication reconciliation completed during visit, HYPERTENSION  Tobacco use  Peripheral edema  Dyslipidemia   Expressive aphasia  Obesity  Seizure disorder, [facility], 05/14/21 10:13:00 CDT  Clinic Follow up, *Est. 08/10/21 14:20:00 CDT, Order for future visit, Seizure disorder, [facility]  Office/Outpatient Visit Level 4 Established [id number] PC, HYPERTENSION  Tobacco use  Peripheral edema  Dyslipidemia  Expressive aphasia  Obesity  Seizure disorder, [facility], 05/14/21 10:12:00 CDT  ?  3.?HYPERTENSION?I10,?HTN (hypertension)?I10,?HTN (hypertension)?I10  Increase Losartan to 100mg po daily  Notify STAT HH of change to medications, continue Home Health Services for surveillance of BP after medication changes  Call pharmacy and notify of change in medication  Refill Kcl 10mEq today  RTC 3 months  Continue current regimen  BNP today, CBC, CMP  Follow a low sodium (less than 2 grams of sodium per day), DASH diet.?  Continue medications as prescribed.  Monitor blood pressure and report any consistent values greater than 140/90 and keep a log.  Encouraged?smoking cessation to aid in BP reduction and co-morbidities.?  Maintain healthy weight with a BMI goal of <30.?  Aerobic exercise for 150 minutes per week (or 5 days a week for 30 minutes each day).  Ordered:  losartan, 100 mg = 1 tab(s), Oral, Daily, # 30 tab(s), 3 Refill(s), Pharmacy: [facility], 176, cm, Height/Length Dosing, 05/14/21 9:35:00 CDT, 119.6, kg, Weight Dosing, 05/14/21 9:35:00 CDT  1160F- Medication reconciliation completed during visit, HYPERTENSION  Tobacco use  Peripheral edema  Dyslipidemia  Expressive aphasia  Obesity  Seizure disorder, [facility], 05/14/21 10:13:00 CDT  B-Type Natriuretic Peptide, Routine collect, 05/14/21 10:40:00 CDT, Blood, Stop date 05/14/21 10:40:00 CDT, Lab Collect, Venous Draw, HTN (hypertension)  Peripheral edema, 05/14/21 10:36:00 CDT  CBC w/ Auto Diff, Routine collect, 05/14/21 10:40:00 CDT, Blood, Stop date 05/14/21 10:40:00 CDT, Lab Collect, Venous Draw, Tobacco use  HTN  (hypertension)  Peripheral edema, 05/14/21 10:10:00 CDT  Clinic Follow up, *Est. 08/10/21 14:20:00 CDT, Order for future visit, Seizure disorder, [facility]  Comprehensive Metabolic Panel, Routine collect, 05/14/21 10:40:00 CDT, Blood, Stop date 05/14/21 10:40:00 CDT, Lab Collect, Venous Draw, Tobacco use  HTN (hypertension)  Peripheral edema, 05/14/21 10:10:00 CDT  Hemoglobin A1C [facility], Routine collect, 05/14/21 10:40:00 CDT, Blood, Stop date 05/14/21 10:40:00 CDT, Lab Collect, Venous Draw, Tobacco use  HTN (hypertension)  Peripheral edema, 05/14/21 10:10:00 CDT  Office/Outpatient Visit Level 4 Established [id number] PC, HYPERTENSION  Tobacco use  Peripheral edema  Dyslipidemia  Expressive aphasia  Obesity  Seizure disorder, [facility], 05/14/21 10:12:00 CDT  Request HgA1c Results, 05/14/21 10:09:00 CDT, HTN (hypertension)  Tobacco use  Peripheral edema  ?  4.?Obesity?E66.8  BMI???38.61.? Goal BMI less than 30.  Aerobic exercise 150min/week  Avoid soda, simple sugars, saturated fats and processed foods, sweets, excessive rice, pasta, potatoes, bread  Limit fast food  Eat plenty of fresh fruits and vegetables  Ordered:  1160F- Medication reconciliation completed during visit, HYPERTENSION  Tobacco use  Peripheral edema  Dyslipidemia  Expressive aphasia  Obesity  Seizure disorder, [facility], 05/14/21 10:13:00 CDT  Clinic Follow up, *Est. 08/10/21 14:20:00 CDT, Order for future visit, Seizure disorder, [facility]  Office/Outpatient Visit Level 4 Established [id number] PC, HYPERTENSION  Tobacco use  Peripheral edema  Dyslipidemia  Expressive aphasia  Obesity  Seizure disorder, [facility], 05/14/21 10:12:00 CDT  ?  5.?Tobacco user?Z72.0,?Tobacco use?Z72.0,?Tobacco use?Z72.0  CT lung screening ordered today ?  Smoking cessation discussed.?  Pt ready to quit.  Referred to Smoking Cessation program.  Discussed benefits of quitting including improved health, decreased  cardiac/vascular/pulmonary/stroke risks as well as saving money.  Ordered:  1160F- Medication reconciliation completed during visit, HYPERTENSION  Tobacco use  Peripheral edema  Dyslipidemia  Expressive aphasia  Obesity  Seizure disorder, [facility], 05/14/21 10:13:00 CDT  CBC w/ Auto Diff, Routine collect, 05/14/21 10:40:00 CDT, Blood, Stop date 05/14/21 10:40:00 CDT, Lab Collect, Venous Draw, Tobacco use  HTN (hypertension)  Peripheral edema, 05/14/21 10:10:00 CDT  Clinic Follow up, *Est. 08/10/21 14:20:00 CDT, Order for future visit, Seizure disorder, [facility]  Comprehensive Metabolic Panel, Routine collect, 05/14/21 10:40:00 CDT, Blood, Stop date 05/14/21 10:40:00 CDT, Lab Collect, Venous Draw, Tobacco use  HTN (hypertension)  Peripheral edema, 05/14/21 10:10:00 CDT  Hemoglobin A1C [facility], Routine collect, 05/14/21 10:40:00 CDT, Blood, Stop date 05/14/21 10:40:00 CDT, Lab Collect, Venous Draw, Tobacco use  HTN (hypertension)  Peripheral edema, 05/14/21 10:10:00 CDT  Office/Outpatient Visit Level 4 Established [id number] PC, HYPERTENSION  Tobacco use  Peripheral edema  Dyslipidemia  Expressive aphasia  Obesity  Seizure disorder, [facility], 05/14/21 10:12:00 CDT  Referral to Lung Cancer Screening, tobacco cessation, Tobacco use  Request HgA1c Results, 05/14/21 10:09:00 CDT, HTN (hypertension)  Tobacco use  Peripheral edema  Request Spirometry Results, 05/14/21 10:21:00 CDT, Tobacco use  ?  6.?Type 2 diabetes mellitus?E11.9  HgbA1c today, CMP today  Last POC in hospital was 5.6  Is on Janumet XR 50-500mg po daily  Continue medications as prescribed.  Follow ADA diet.  Avoid soda, simple sweets, and limit rice/pasta/bread/starches and consume brown options when possible.?  Maintain healthy weight with BMI goal <30.?  Perform aerobic exercise for 150 minutes per week (or 5 days a week for 30 minutes each day).?  Examine feet daily.?  Obtain annual dilated eye exam.  Eye exam:  Fundus in 2/2021  Foot exam:?In hospital 3/2021  Ordered:  3011F- Lipid panel results documented and reviewed (must include total, HDL, Trig, & LDL), Dyslipidemia  Type 2 diabetes mellitus, [facility], 05/14/21 10:43:00 CDT  ?  7.?Increased frequency of urination?R35.0  ?UA today  Ordered:  Urinalysis with Microscopic if Indicated, Routine collect, Urine, 05/14/21 10:24:00 CDT, Stop date 05/14/21 10:24:00 CDT, Nurse collect, Increased frequency of urination  ?  8.?Peripheral edema?R60.9,?Peripheral edema?R60.9,?Peripheral edema?R60.9,?Peripheral edema?R60.9,?Peripheral edema?R60.9  Lasix currently at 40mg po daily  We are increasing his Losartan today  He has appt with [facility] in July, will discuss with Cardiology  KCL 10mEq po daily.  Ordered:  potassium chloride, 10 mEq = 1 tab(s), Oral, Daily, # 30 tab(s), 3 Refill(s), Pharmacy: [facility], 176, cm, Height/Length Dosing, 05/14/21 9:35:00 CDT, 119.6, kg, Weight Dosing, 05/14/21 9:35:00 CDT  1160F- Medication reconciliation completed during visit, HYPERTENSION  Tobacco use  Peripheral edema  Dyslipidemia  Expressive aphasia  Obesity  Seizure disorder, [facility], 05/14/21 10:13:00 CDT  B-Type Natriuretic Peptide, Routine collect, 05/14/21 10:40:00 CDT, Blood, Stop date 05/14/21 10:40:00 CDT, Lab Collect, Venous Draw, HTN (hypertension)  Peripheral edema, 05/14/21 10:36:00 CDT  CBC w/ Auto Diff, Routine collect, 05/14/21 10:40:00 CDT, Blood, Stop date 05/14/21 10:40:00 CDT, Lab Collect, Venous Draw, Tobacco use  HTN (hypertension)  Peripheral edema, 05/14/21 10:10:00 CDT  Clinic Follow up, *Est. 08/10/21 14:20:00 CDT, Order for future visit, Seizure disorder, [facility]  Comprehensive Metabolic Panel, Routine collect, 05/14/21 10:40:00 CDT, Blood, Stop date 05/14/21 10:40:00 CDT, Lab Collect, Venous Draw, Tobacco use  HTN (hypertension)  Peripheral edema, 05/14/21 10:10:00 CDT  Hemoglobin A1C [facility], Routine collect, 05/14/21  10:40:00 CDT, Blood, Stop date 05/14/21 10:40:00 CDT, Lab Collect, Venous Draw, Tobacco use  HTN (hypertension)  Peripheral edema, 05/14/21 10:10:00 CDT  Office/Outpatient Visit Level 4 Established [id number] PC, HYPERTENSION  Tobacco use  Peripheral edema  Dyslipidemia  Expressive aphasia  Obesity  Seizure disorder, [facility], 05/14/21 10:12:00 CDT  Request HgA1c Results, 05/14/21 10:09:00 CDT, HTN (hypertension)  Tobacco use  Peripheral edema  ?  11.?Anxiety and depression?F41.9  Continue Quetiapine and Venlafaxine  Referral to Psych NP here, no appt with Angel  Read positive daily meditations, avoid negative media, set healthy boundaries.  Exercise daily, keep consistent sleep pattern, and eat a healthy diet.  Establish good social support, make changes to reduce stress.  Reports any symptoms of suicidal/homicidal ideations or self-harm immediately, if clinic is closed go to nearest emergency room.  ?  12.?Seizure disorder?G40.409,?  Neuro appt 7/2021. 4 or 5 years since last seizure.  Seizure disorder?G40.409  Ordered:  1160F- Medication reconciliation completed during visit, HYPERTENSION  Tobacco use  Peripheral edema  Dyslipidemia  Expressive aphasia  Obesity  Seizure disorder, [facility], 05/14/21 10:13:00 CDT  Clinic Follow up, *Est. 08/10/21 14:20:00 CDT, Order for future visit, Seizure disorder, [facility]  Keppra (Levetiracetam) Level-[facility] [id number], Routine collect, 05/14/21 10:40:00 CDT, Blood, Stop date 05/14/21 10:40:00 CDT, Lab Collect, Venous Draw, Seizure disorder, 05/14/21 10:35:00 CDT  Office/Outpatient Visit Level 4 Established [id number] PC, HYPERTENSION  Tobacco use  Peripheral edema  Dyslipidemia  Expressive aphasia  Obesity  Seizure disorder, [facility], 05/14/21 10:12:00 CDT  ?  13.?PUD (peptic ulcer disease)?K27.9  Continue Carafate  ?  14.?Insomnia?G47.00  Add melatonin to regimen.  Continue Quetiapine, unable to prescribe Hydroxyzine due to  interaction with Kcl.  Avoid caffeine, alcohol?and stimulants. Do not use illicit drugs.  Practice positive phrases and repeat throughout the day.  Try?yoga,?lavender scents or Chamomile tea to promote relaxation.  Set healthy boundaries, avoid people and conversations that increase stress.  Avoid caffeinated beverages after lunch  Avoid alcohol near bedtime (eg, late afternoon and evening)  Avoid smoking or other nicotine intake, particularly during the evening  Exercise regularly for at least 20 minutes, preferably more than four to five hours prior to bedtime  Avoid daytime naps, especially if they are longer than 20 to 30 minutes or occur late in the day  Resolve concerns or worries before bedtime  Try not to force sleep  ?   Sleep Hygiene Techniques: Sleep hygiene refers to actions that tend to improve and maintain good sleep  Power down electronic devices at least one hour prior to bedtime.  Keep room dark; use eye mask or relaxation sound machine to promote rest.  Sleep as long as necessary to feel rested (usually seven to eight hours for adults) and then get out of bed  Maintain a regular sleep schedule, particularly a regular wake-up time in the morning  ?  15.?GERD (gastroesophageal reflux disease)?K21.9  Continue Carafate and Protonix as directed  Avoid spicy, acidic, fried foods and alcohol.  Eat 2-3 hours before going to bed.  Avoid tight clothing, chew food thoroughly.  Reduce caffeine intake, avoid soda.  ?  16.?Coronary artery disease?I25.10  ?Keep all appt with [facility]  Continue daily Aspirin.  Obtain records from [facility]  Stressed importance of adequate LDL, HA1C, and BP control.  Discussed appropriate lifestyle changes including smoking cessation, exercise, weight loss, and dietary modifications.  ED precautions reviewed including SOB, LOERA, chest pain, dizziness, near syncope, palpitations, or jaw/back/neck discomfort.? [1]  ?  17.?History of stroke?Z86.73  ?Neuro appt 7/2021  ?  Orders:  Occult Blood  Fecal Immunoassay, Routine collect, Stool, Order for future visit, *Est. 05/14/21 3:00:00 CDT, *Est. Stop date 05/14/21 3:00:00 CDT, Nurse collect, Encounter for screening for malignant neoplasm of colon  Referrals  [facility] Internal Referral to IM Psych Nurse, Specialty: Mental Health, Outpatient, Reason: anxiety/depression, Refer To: Outpatient, Mental Health, [facility], [address] [place] [address]., Start: 05/14/21 10:42:00 CDT  Clinic Follow up, *Est. 08/10/21 14:20:00 CDT, Order for future visit, Seizure disorder, [facility]  Referral to Lung Cancer Screening, tobacco cessation, Tobacco use   Problem List/Past Medical History  Ongoing  Dyslipidemia  Expressive aphasia  HYPERTENSION  Obesity  Obesity  Seizure disorder  Tobacco user  Type 2 diabetes mellitus  Historical  ACUTE MYOCARDIAL INFARCTION  CVA (cerebral infarction)  Discharge planning  Lactic acidosis  Neck repair  Oropharyngeal dysphagia  PEG Tube Placement  Peptic ulcer disease  Percutaneous endoscopic gastrostomy tube externally removable  Rebound hypertension  Tuberculosis  Uncontrolled hypertension  Procedure/Surgical History  Esophagogastroduodenoscopy (02/12/2020)  Inspection of Upper Intestinal Tract, Via Natural or Artificial Opening Endoscopic (02/12/2020)  Placement Of Naso Orogastric Tube (02/12/2020)  Insertion of Infusion Device into Superior Vena Cava, Percutaneous Approach (02/11/2020)  Biopsy Gastrointestinal (04/03/2019)  Esophagogastroduodenoscopy (04/03/2019)  Excision of Esophagus, Via Natural or Artificial Opening Endoscopic, Diagnostic (04/03/2019)  Excision of Stomach, Pylorus, Via Natural or Artificial Opening Endoscopic, Diagnostic (04/03/2019)  Insertion of Pacemaker Lead into Right Atrium, Percutaneous Approach (09/21/2017)  Insertion of Pacemaker Lead into Right Ventricle, Percutaneous Approach (09/21/2017)  Insertion of Pacemaker, Dual Chamber into Chest Subcutaneous Tissue and  Fascia, Open Approach (09/21/2017)  Fluoroscopy of Left Heart using Other Contrast (09/20/2017)  Fluoroscopy of Multiple Coronary Arteries using Other Contrast (09/20/2017)  Measurement of Cardiac Sampling and Pressure, Left Heart, Percutaneous Approach (09/20/2017)  Performance of Cardiac Pacing, Continuous (09/20/2017)  Fluoroscopy of Multiple Coronary Arteries using Low Osmolar Contrast (07/05/2017)  Fluoroscopy of Pelvic Arteries using Low Osmolar Contrast (07/05/2017)  Fluoroscopy of Right Heart using Low Osmolar Contrast (07/05/2017)  Measurement of Cardiac Sampling and Pressure, Left Heart, Percutaneous Approach (07/05/2017)  Central Venous Catheter Placement with Guidance (04/14/2014)  Continuous invasive mechanical ventilation for less than 96 consecutive hours (04/09/2014)  Insertion of endotracheal tube (04/09/2014)  Magnetic resonance imaging of brain and brain stem (02/28/2014)  Magnetic resonance imaging of other and unspecified sites (02/28/2014)  Injection or infusion of thrombolytic agent (02/27/2014)  HOSPITAL OBSERVATION SERVICE, PER HOUR (09/25/2013)  HOSPITAL OBSERVATION SERVICE, PER HOUR (08/21/2013)  Back Surgery  Cardiac pacemaker   Medications  Advair Diskus 250 mcg-50 mcg inhalation powder, 1 puff(s), INH, BID, 3 refills  atorvastatin 40 mg oral tablet, 40 mg= 1 tab(s), Oral, Daily, 6 refills  cholecalciferol 1000 intl units (25 mcg) oral capsule, 1000 IntUnit= 1 cap(s), Oral, Daily, 5 refills  clopidogrel 75 mg oral tablet, 75 mg= 1 tab(s), Oral, Daily, 6 refills  Dilaudid 2 mg/mL injectable solution, 1 mg= 0.5 mL, IV Push, Once  donepezil 5 mg oral tablet, disintegrating, 5 mg= 1 tab(s), Oral, Once a day (at bedtime), 4 refills  ferrous sulfate 325 mg (65 mg elemental iron) oral enteric coated tablet, 325 mg= 1 tab(s), Oral, Daily, 3 refills  Janumet XR 50 mg-500 mg oral tablet, extended release, 1 tab(s), Oral, Daily, 2 refills  Keppra 500 mg oral tablet, 1000 mg= 2 tab(s), Oral, BID, 3  refills  Lasix 40 mg oral tablet, 40 mg= 1 tab(s), Oral, Daily, 3 refills  losartan 100 mg oral tablet, 100 mg= 1 tab(s), Oral, Daily, 3 refills  memantine 10 mg oral tablet, 10 mg= 1 tab(s), Oral, BID, 5 refills  metoprolol succinate 50 mg oral tablet, extended release, 50 mg= 1 tab(s), Oral, Daily, 3 refills  Pantoprazole 40 mg ORAL EC-Tablet, 40 mg= 1 tab(s), Oral, BID, 3 refills  potassium chloride 10 mEq oral TABLET extended release, 10 mEq= 1 tab(s), Oral, Daily, 3 refills  ProAir HFA 90 mcg/inh inhalation aerosol with adapter, 1 puff(s), INH, q4hr, PRN, 1 refills  QUEtiapine 200 mg oral tablet, extended release, 200 mg= 1 tab(s), Oral, qPM, 5 refills  sucralfate 1 g/10 mL oral suspension, 1 gm= 10 mL, Oral, QID, 3 refills  venlafaxine 150 mg oral capsule, extended release, 150 mg= 1 cap(s), Oral, Daily, 3 refills  Vitamin B12 1000 mcg oral tablet, 1000 mcg= 1 tab(s), Oral, Daily, 5 refills  Allergies  aspirin?(Rash, unknown)  contrast media (iodine-based)?(throat closes, unknown)  hydrALAZINE?(Rash, unknown, Tachycardia)  iodine?(Rash, unknown)  penicillin?(rash)  Social History  Abuse/Neglect  No, No, Yes, 05/14/2021  Alcohol - Denies Alcohol Use, 08/21/2013  Never, 07/30/2018  Past, 08/24/2016  Employment/School  Disabled, 08/24/2016  Exercise  Exercise frequency: 1-2 times/week., 08/24/2016  Financial/Legal Situation  None, 02/01/2021  Home/Environment  Lives with Father, Mother., 08/24/2016    Never in , 02/01/2021  Nutrition/Health  Regular, 08/24/2016  Sexual  Sexually active: No. Sexual orientation: Straight or heterosexual. Gender Identity Identifies as male., 03/12/2021  Spiritual/Cultural  Religion, 02/01/2021  Substance Use - Denies Substance Abuse, 08/22/2013  Never, 08/24/2016  Tobacco - High Risk, 08/22/2013  10 or more cigarettes (1/2 pack or more)/day in last 30 days, Yes, 05/14/2021  Family History  Cataract.: Mother and Father.  Diabetes mellitus type 2: Father.  Heart  disease: Mother, Father and Sister.  Hypertension.: Father and Brother.  Hyperthyroidism.: Sister.  Osteoporosis: Mother and Sister.  Pacemaker care: Father.  Peripheral vascular disease.: Mother, Father, Sister and Brother.  Primary malignant neoplasm of colon: Father and Brother.  Immunizations  Vaccine Date Status   influenza virus vaccine, inactivated 02/01/2021 Given   tetanus/diphtheria/pertussis, acel(Tdap) 02/01/2021 Given   influenza virus vaccine, inactivated 11/15/2019 Given   influenza virus vaccine, inactivated 01/24/2019 Recorded   influenza virus vaccine, inactivated 09/19/2017 Given   influenza virus vaccine, inactivated 03/02/2014 Given   pneumococcal 23-polyvalent vaccine 03/02/2014 Given   Health Maintenance  Health Maintenance  ???Pending?(in the next year)  ??? ??OverDue  ??? ? ? ?Colorectal Screening due??02/11/21??and every 1??year(s)  ??? ? ? ?Diabetes Maintenance-HgbA1c due??03/31/21??and every 1??year(s)  ??? ??Due?  ??? ? ? ?COPD Maintenance-Spirometry due??05/14/21??Unknown Frequency  ??? ? ? ?Zoster Vaccine due??05/14/21??Unknown Frequency  ??? ??Refused?  ??? ? ? ?Smoking Cessation due??01/01/21??and every 1??year(s)  ??? ??Due In Future?  ??? ? ? ?Influenza Vaccine not due until??10/01/21??and every 1??day(s)  ??? ? ? ?Diabetes Maintenance-Medication Prescribed not due until??11/11/21??and every 1??year(s)  ??? ? ? ?ADL Screening not due until??11/16/21??and every 1??year(s)  ??? ? ? ?Obesity Screening not due until??01/01/22??and every 1??year(s)  ??? ? ? ?Alcohol Misuse Screening not due until??01/02/22??and every 1??year(s)  ??? ? ? ?Diabetes Maintenance-Fasting Lipid Profile not due until??02/01/22??and every 1??year(s)  ??? ? ? ?Diabetes Maintenance-Eye Exam not due until??02/07/22??and every 1??year(s)  ??? ? ? ?Diabetes Maintenance-Foot Exam not due until??03/12/22??and every 1??year(s)  ??? ? ? ?Medicare Annual Wellness Exam not due until??03/12/22??and every 1??year(s)  ??? ?  ? ?HF-LVEF not due until??03/26/22??and every 1??year(s)  ??? ? ? ?Asthma Management-Asthma Medication Prescribed not due until??03/27/22??and every 1??year(s)  ??? ? ? ?HF-Heart Failure Education not due until??03/27/22??and every 1??year(s)  ??? ? ? ?Hypertension Management-BMP not due until??03/27/22??and every 1??year(s)  ??? ? ? ?Diabetes Maintenance-Serum Creatinine not due until??03/27/22??and every 1??year(s)  ???Satisfied?(in the past 1 year)  ??? ??Satisfied?  ??? ? ? ?ADL Screening on??11/16/20.??Satisfied by Geeta Mccollum  ??? ? ? ?Alcohol Misuse Screening on??02/01/21.??Satisfied by Corry Branch LPN  ??? ? ? ?Asthma Management-Asthma Medication Prescribed on??03/17/21.??Satisfied by Eva Oneal  ??? ? ? ?Blood Pressure Screening on??05/14/21.??Satisfied by Nikki Ellis  ??? ? ? ?Body Mass Index Check on??05/14/21.??Satisfied by Nikki Ellis  ??? ? ? ?COPD Maintenance-Spirometry on??05/14/21.??Satisfied by Eva Oneal  ??? ? ? ?Coronary Artery Disease Maintenance-Lipid Lowering Therapy on??03/26/21.??Satisfied by Gloria Sheriff RN  ??? ? ? ?Coronary Artery Disease Maintenance-Antiplatelet Agent Prescribed on??02/01/21.??Satisfied by Eva Oneal  ??? ? ? ?Depression Screening on??03/12/21.??Satisfied by Corry Branch LPN  ??? ? ? ?Diabetes Maintenance-HgbA1c on??05/14/21.??Satisfied by Eva Oneal  ??? ? ? ?Diabetes Maintenance-Serum Creatinine on??03/27/21.??Satisfied by Basilio Andrews  ??? ? ? ?Diabetes Maintenance-Foot Exam on??03/12/21.??Satisfied by Eva Oneal  ??? ? ? ?Diabetes Maintenance-Eye Exam on??02/07/21.??Satisfied by Riki Christie MD  ??? ? ? ?Diabetes Maintenance-Fasting Lipid Profile on??02/01/21.??Satisfied by Kelsy Ritchie  ??? ? ? ?Diabetes Maintenance-Microalbumin on??02/01/21.??Satisfied by Kelsy Ritchie  ??? ? ? ?Diabetes Maintenance-Medication Prescribed on??11/11/20.??Satisfied  by Geeta Mccollum  ??? ? ? ?Diabetes Screening on??03/27/21.??Satisfied by Basilio Andrews  ??? ? ? ?Hypertension Management-Blood Pressure on??05/14/21.??Satisfied by Nikki Ellis  ??? ? ? ?Hypertension Management-BMP on??03/27/21.??Satisfied by Basilio Andrews  ??? ? ? ?Influenza Vaccine on??03/25/21.??Satisfied by Néstor Roblero RN  ??? ? ? ?Lipid Screening on??02/01/21.??Satisfied by Klesy Ritchie  ??? ? ? ?Medicare Annual Wellness Exam on??03/12/21.??Satisfied by Eva Oneal  ??? ? ? ?Obesity Screening on??05/14/21.??Satisfied by Nikki Ellis  ??? ? ? ?Smoking Cessation on??11/11/20.??Satisfied by Geeta Mccollum  ??? ? ? ?Tetanus Vaccine on??02/01/21.??Satisfied by Corry Branch LPN.  ??? ??Refused?  ??? ? ? ?Smoking Cessation on??02/01/21.??Recorded by Eva Oneal??Reason: Patient Refuses  ??? ? ? ?Tetanus Vaccine on??11/16/20.??Recorded by Geeta Mccollum  ??? ? ? ?Zoster Vaccine on??03/12/21.??Recorded by Eva Oneal  ?     [1]?Office Visit Note; Eva Oneal 02/01/2021 13:27 CST

## 2022-05-02 NOTE — HISTORICAL [facility]
This is a historical note converted from [facility]. Formatting and pictures may have been removed.  Please reference Laura for original formatting and attached multimedia. Chief Complaint  Burning with urination X 5 days.  History of Present Illness  Patient presents to urgent care during COVID 19 health emergency.  ?  60-year-old male?with several days of dysuria, no hematuria,?lower abdominal pain, points to?suprapubic area.? States pain is mild but?he is a poor historian, here with?what seems to be his friend/employer.? Patient denies having any fever?or chills,?no back or flank pain,?no cough or shortness of breath,?no vomiting or diarrhea, no melena or bright red blood per rectum.? Eating and drinking normally.? Normal appetite.  Chart reviewed  Review of Systems  Constitutional: negative except as stated in HPI  Eye: negative except as stated in HPI  ENT: negative except as stated in HPI  Respiratory: negative except as stated in HPI  Cardiovascular: negative except as stated in HPI  Gastrointestinal: negative except as stated in HPI  Genitourinary: negative except as stated in HPI  Hema/Lymph: negative except as stated in HPI  Endocrine: negative except as stated in HPI  Immunologic: negative except as stated in HPI  Musculoskeletal: negative except as stated in HPI  Integumentary: negative except as stated in HPI  Neurologic: negative except as stated in HPI  ?  Physical Exam  Vitals & Measurements  T:?37.0? ?C (Oral)? HR:?110(Peripheral)? RR:?22? BP:?155/107? SpO2:?96%?  HT:?177.00?cm? WT:?109.000?kg? BMI:?34.79?  VITAL SIGNS: ?Reviewed. ? ?  GENERAL: In no apparent distress  HEAD:? No signs of head trauma  EYES: Pupils are equal.? Extraocular motions intact??  CHEST: Clear breath sounds bilaterally.? No wheezes  CARDIAC: Regular  ABDOMEN:?Soft, mild?left lower quadrant ?fullness and?tenderness to deep palpation,?mild suprapubic tenderness,?no guarding, no rebound, no right lower quadrant  tenderness.?  NEUROLOGIC EXAM: Alert and oriented x 3.? No focal sensory or strength deficits.?? Limited speech.? Follows commands  PSYCHIATRIC: Cooperative, appropriate mood and affect  SKIN: No visualized rash  ?  ?  ?  ?(02/10/101366:38 CST CT Abdomen and Pelvis W Contrast)  Reason For Exam  Abdominal Pain  ?  Radiology Report  EXAMINATION: CT the abdomen pelvis with contrast  ?  EXAMINATION DATE: 2/10/2020  ?  COMPARISON: 11/13/2019  ?  TECHNIQUE: Multiple cross-sectional images of the abdomen pelvis were  obtained after the intravenous administration of contrast. Coronal and  sagittal reformatted images were obtained.  ?  CLINICAL HISTORY: NO SPECIFIC CLINICAL HISTORY PROVIDED BY THE  ORDERING CLINICIAN. NONSPECIFIC HISTORY OF ABDOMINAL PAIN.  ?  FINDINGS:  ?  Trace left and small right effusion with dependent atelectatic changes  of likely scarring. Heart size is within normal limits with partially  visualized pacer leads.  ?  The liver, spleen, adrenals, kidneys, and pancreas are normal.  Gallbladder is present.  ?  Diffuse mucosal thickening of the distal esophagus with hyperemia of  the mucosa, which is new compared to the prior examination. The  gastric body is distended. The distal small bowel is of normal caliber  as is the colon. The appendix is not definitively identified.  ?  The bladder and prostate are normal. No free fluid in abdomen pelvis.  No lymphadenopathy. The course and caliber the abdominal aorta is  normal with scattered calcified atheromatous disease.  ?  No suspicious osseous lesions. Soft tissues are normal.  ?  IMPRESSION:  1. Findings are suggestive of esophagitis with diffuse also thickening  hyperemia of the distal visualized esophagus.  2. Distention of the gastric body without evidence for outlet  obstruction.  3. Other secondary findings as above  ?  Signature Line  Electronically Signed By: Silverio Perez DO  Date/Time Signed: 02/10/2020 14:47 [1]  Assessment/Plan  1.?Abdominal  pain?R10.9  Ordered:  Amylase Level, Stat collect, 09/08/20 12:59:00 CDT, Blood, Stop date 09/08/20 13:00:00 CDT, Nurse collect, Abdominal pain, 09/08/20 12:59:00 CDT  Automated Diff, Stat collect, 09/08/20 13:18:00 CDT, Blood, Collected, Stop date 09/08/20 13:18:00 CDT, Nurse collect, Abdominal pain, 09/08/20 12:59:00 CDT  CBC w/ Auto Diff, Stat collect, 09/08/20 12:59:00 CDT, Blood, Stop date 09/08/20 13:00:00 CDT, Nurse collect, Abdominal pain, 09/08/20 12:59:00 CDT  Comprehensive Metabolic Panel, Stat collect, 09/08/20 13:00:00 CDT, Blood, Stop date 09/08/20 13:00:00 CDT, Nurse collect, Abdominal pain, 09/08/20 13:00:00 CDT  Office/Outpatient Visit Level 4 New [id number] PC, Abdominal pain, 09/08/20 13:32:00 CDT  Urinalysis Microscopic [facility], Stat collect, Urine, Collected, 09/08/20 12:13:00 CDT, Stop date 09/08/20 12:13:00 CDT, Nurse collect, Abdominal pain  Urinalysis with Microscopic if Indicated, Stat collect, Urine, 09/08/20 13:00:00 CDT, Stop date 09/08/20 13:00:00 CDT, Nurse collect, Abdominal pain  Urine Culture [id number], Stat collect, 09/08/20 13:00:00 CDT, Urine, Nurse collect, Stop date 09/08/20 13:00:00 CDT, Abdominal pain  ?  Orders:  ciprofloxacin, 500 mg = 1 tab(s), Oral, q12hr, X 10 day(s), # 20 tab(s), 0 Refill(s), Pharmacy: [facility], 177, cm, Height/Length Dosing, 09/08/20 12:22:00 CDT, 109, kg, Weight Dosing, 09/08/20 12:22:00 CDT  metroNIDAZOLE, 500 mg = 1 tab(s), Oral, q8hr, X 10 day(s), # 30 tab(s), 0 Refill(s), Pharmacy: "[facility], 177, cm, Height/Length Dosing, 09/08/20 12:22:00 CDT, 109, kg, Weight Dosing, 09/08/20 12:22:00 CDT  Discussed potential etiologies of abdominal pain.? Urine dip with trace blood, otherwise clear.? Suspect diverticulitis. ?Discussed?work-up?strategies including?recommendation to?be evaluated in the emergency room?for imaging. ?Patient would rather try treatment.? Will get labs, allow him to go home?with strict ER precautions.? Stressed?the need to push  p.o. fluids. ?Fill Cipro and Flagyl.? Return to urgent care immediately for new or worsening symptoms or if current symptoms are not improving by tomorrow.? Regardless, return?to see me on Thursday for recheck.??We will call if?lab indicates anything urgent?needing to be addressed.? He voiced understanding?of my recommendations and precautions.   Problem List/Past Medical History  Ongoing  Dyslipidemia  Expressive aphasia  Headache  History of seizure  HYPERTENSION  Obesity  Obesity  Seizure disorder  Tobacco user  Type 2 diabetes mellitus  Uncontrolled hypertension  Historical  ACUTE MYOCARDIAL INFARCTION  CVA (cerebral infarction)  Discharge planning  Lactic acidosis  Neck repair  Oropharyngeal dysphagia  PEG Tube Placement  Peptic ulcer disease  Percutaneous endoscopic gastrostomy tube externally removable  Rebound hypertension  Tuberculosis  Procedure/Surgical History  Esophagogastroduodenoscopy (02/12/2020)  Inspection of Upper Intestinal Tract, Via Natural or Artificial Opening Endoscopic (02/12/2020)  Placement Of Naso Orogastric Tube (02/12/2020)  Insertion of Infusion Device into Superior Vena Cava, Percutaneous Approach (02/11/2020)  Biopsy Gastrointestinal (04/03/2019)  Esophagogastroduodenoscopy (04/03/2019)  Excision of Esophagus, Via Natural or Artificial Opening Endoscopic, Diagnostic (04/03/2019)  Excision of Stomach, Pylorus, Via Natural or Artificial Opening Endoscopic, Diagnostic (04/03/2019)  Insertion of Pacemaker Lead into Right Atrium, Percutaneous Approach (09/21/2017)  Insertion of Pacemaker Lead into Right Ventricle, Percutaneous Approach (09/21/2017)  Insertion of Pacemaker, Dual Chamber into Chest Subcutaneous Tissue and Fascia, Open Approach (09/21/2017)  Fluoroscopy of Left Heart using Other Contrast (09/20/2017)  Fluoroscopy of Multiple Coronary Arteries using Other Contrast (09/20/2017)  Measurement of Cardiac Sampling and Pressure, Left Heart, Percutaneous Approach  (09/20/2017)  Performance of Cardiac Pacing, Continuous (09/20/2017)  Fluoroscopy of Multiple Coronary Arteries using Low Osmolar Contrast (07/05/2017)  Fluoroscopy of Pelvic Arteries using Low Osmolar Contrast (07/05/2017)  Fluoroscopy of Right Heart using Low Osmolar Contrast (07/05/2017)  Measurement of Cardiac Sampling and Pressure, Left Heart, Percutaneous Approach (07/05/2017)  Central Venous Catheter Placement with Guidance (04/14/2014)  Continuous invasive mechanical ventilation for less than 96 consecutive hours (04/09/2014)  Insertion of endotracheal tube (04/09/2014)  Magnetic resonance imaging of brain and brain stem (02/28/2014)  Magnetic resonance imaging of other and unspecified sites (02/28/2014)  Injection or infusion of thrombolytic agent (02/27/2014)  HOSPITAL OBSERVATION SERVICE, PER HOUR (09/25/2013)  HOSPITAL OBSERVATION SERVICE, PER HOUR (08/21/2013)  Back Surgery  Cardiac pacemaker   Medications  amlodipine 10 mg oral tablet, 10 mg= 1 tab(s), Oral, Daily, 5 refills  atorvastatin 40 mg oral tablet, 40 mg= 1 tab(s), Oral, Daily, 6 refills  Cipro 500 mg oral tablet, 500 mg= 1 tab(s), Oral, q12hr  clopidogrel 75 mg oral tablet, 75 mg= 1 tab(s), Oral, Daily, 6 refills  cyclobenzaprine 10 mg oral tablet, 10 mg= 1 tab(s), Oral, TID  Dilaudid 2 mg/mL injectable solution, 1 mg= 0.5 mL, IV Push, Once  donepezil 5 mg oral tablet, See Instructions  donepezil 5 mg oral tablet, disintegrating, 5 mg= 1 tab(s), Oral, Once a day (at bedtime), 4 refills,? ?Not taking  ferrous sulfate 325 mg (65 mg elemental iron) oral enteric coated tablet, 325 mg= 1 tab(s), Oral, Daily, 3 refills  Flagyl 500 mg oral tablet, 500 mg= 1 tab(s), Oral, q8hr  furosemide 20 mg oral tablet, 20 mg= 1 tab(s), Oral, Daily  Janumet XR 50 mg-500 mg oral tablet, extended release, 1 tab(s), Oral, Daily, 2 refills  Keppra 500 mg oral tablet, 1000 mg= 2 tab(s), Oral, BID  losartan 50 mg oral tablet, 50 mg= 1 tab(s), Oral, Daily, 1  refills  memantine 10 mg oral tablet, 10 mg= 1 tab(s), Oral, BID, 5 refills  metoprolol succinate 50 mg oral tablet, extended release, 50 mg= 1 tab(s), Oral, Daily, 2 refills  Pantoprazole 40 mg ORAL EC-Tablet, 40 mg= 1 tab(s), Oral, BID, 1 refills  QUEtiapine 200 mg oral tablet, extended release, 200 mg= 1 tab(s), Oral, qPM, 5 refills  sucralfate 1 g/10 mL oral suspension, 1 gm= 10 mL, Oral, QID  traZODone 100 mg oral tablet, See Instructions, 4 refills  traZODone 100 mg oral tablet, 100 mg= 1 tab(s), Oral, At Bedtime, 1 refills,? ?Not taking  venlafaxine 150 mg oral capsule, extended release, 150 mg= 1 cap(s), Oral, Daily, 1 refills  Vitamin B12 1000 mcg oral tablet, 1000 mcg= 1 tab(s), Oral, Daily, 5 refills  Vitamin D3 50,000 intl units (1250 mcg) oral capsule, 92467 IntUnit= 1 cap(s), Oral, qWeek  Allergies  aspirin?(Rash, unknown)  contrast media (iodine-based)?(throat closes, unknown)  hydrALAZINE?(Rash, unknown, Tachycardia)  iodine?(Rash, unknown)  penicillin?(rash)  Social History  Abuse/Neglect  No, 09/08/2020  Alcohol - Denies Alcohol Use, 08/21/2013  Never, 07/30/2018  Past, 08/24/2016  Employment/School  Disabled, 08/24/2016  Exercise  Exercise frequency: 1-2 times/week., 08/24/2016  Home/Environment  Lives with Father, Mother., 08/24/2016  Nutrition/Health  Regular, 08/24/2016  Substance Use - Denies Substance Abuse, 08/22/2013  Never, 08/24/2016  Tobacco - High Risk, 08/22/2013  10 or more cigarettes (1/2 pack or more)/day in last 30 days, N/A, 10 per day., 09/08/2020  Family History  Family history is unknown  Immunizations  Vaccine Date Status   influenza virus vaccine, inactivated 11/15/2019 Given   influenza virus vaccine, inactivated 09/19/2017 Given   influenza virus vaccine, inactivated 03/02/2014 Given   pneumococcal 23-polyvalent vaccine 03/02/2014 Given   Health Maintenance  Health Maintenance  ???Pending?(in the next year)  ??? ??OverDue  ??? ? ? ?Diabetes Maintenance-Microalbumin due??and  every?  ??? ? ? ?Influenza Vaccine due??and every?  ??? ? ? ?Diabetes Maintenance-Medication Prescribed due??06/17/20??and every 1??year(s)  ??? ??Due?  ??? ? ? ?Diabetes Maintenance-Eye Exam due??09/08/20??and every?  ??? ? ? ?Diabetes Maintenance-Foot Exam due??09/08/20??and every?  ??? ? ? ?Lung Cancer Screening due??09/08/20??and every 1??year(s)  ??? ? ? ?Tetanus Vaccine due??09/08/20??and every 10??year(s)  ??? ? ? ?Zoster Vaccine due??09/08/20??and every?  ??? ??Refused?  ??? ? ? ?Smoking Cessation due??01/01/20??and every 1??year(s)  ??? ??Due In Future?  ??? ? ? ?ADL Screening not due until??11/13/20??and every 1??year(s)  ??? ? ? ?Obesity Screening not due until??01/01/21??and every 1??year(s)  ??? ? ? ?Alcohol Misuse Screening not due until??01/02/21??and every 1??year(s)  ??? ? ? ?Colorectal Screening not due until??02/11/21??and every 1??year(s)  ??? ? ? ?Diabetes Maintenance-HgbA1c not due until??03/31/21??and every 1??year(s)  ??? ? ? ?Hypertension Management-BMP not due until??03/31/21??and every 1??year(s)  ??? ? ? ?Medicare Annual Wellness Exam not due until??03/31/21??and every 1??year(s)  ??? ? ? ?Diabetes Maintenance-Fasting Lipid Profile not due until??03/31/21??and every 1??year(s)  ??? ? ? ?Diabetes Maintenance-Serum Creatinine not due until??03/31/21??and every 1??year(s)  ??? ? ? ?Hypertension Management-Education not due until??03/31/21??and every 1??year(s)  ???Satisfied?(in the past 1 year)  ??? ??Satisfied?  ??? ? ? ?ADL Screening on??11/13/19.??Satisfied by Tanner Foster  ??? ? ? ?Alcohol Misuse Screening on??03/31/20.??Satisfied by Coleen Molina  ??? ? ? ?Blood Pressure Screening on??09/08/20.??Satisfied by Juanpablo Vaughn  ??? ? ? ?Body Mass Index Check on??09/08/20.??Satisfied by Juanpablo Vaughn  ??? ? ? ?Colorectal Screening on??02/12/20.??Satisfied by Vandana Crawford  ??? ? ? ?Coronary Artery Disease Maintenance-Antiplatelet Agent Prescribed on??02/17/20.??Satisfied by Deena  Shanta JEAN BAPTISTE  ??? ? ? ?Coronary Artery Disease Maintenance-Lipid Lowering Therapy on??02/17/20.??Satisfied by Shanta Shaffer MD  ??? ? ? ?Depression Screening on??09/08/20.??Satisfied by Juanpablo Vaughn  ??? ? ? ?Diabetes Maintenance-Fasting Lipid Profile on??03/31/20.??Satisfied by [facility], [facility]  ??? ? ? ?Diabetes Maintenance-HgbA1c on??03/31/20.??Satisfied by Coleen Molina  ??? ? ? ?Diabetes Maintenance-Serum Creatinine on??03/31/20.??Satisfied by Contributor[facility]system, [facility]  ??? ? ? ?Diabetes Screening on??02/17/20.??Satisfied by Shane Sanchez Jr.  ??? ? ? ?Hypertension Management-Blood Pressure on??09/08/20.??Satisfied by Juanpablo Vaughn  ??? ? ? ?Hypertension Management-BMP on??03/31/20.??Satisfied by Contributor[facility]system, [facility]  ??? ? ? ?Hypertension Management-Education on??03/31/20.??Satisfied by Coleen Molina  ??? ? ? ?Influenza Vaccine on??11/15/19.??Satisfied by TIERNEY SCHULZ, FABRICIO  ??? ? ? ?Lipid Screening on??03/31/20.??Satisfied by Contributor[facility]system, [facility]  ??? ? ? ?Medicare Annual Wellness Exam on??03/31/20.??Satisfied by Cirilo JEAN BAPTISTE, Kvng Mota  ??? ? ? ?Obesity Screening on??09/08/20.??Satisfied by Juanpablo Vaughn  ??? ??Refused?  ??? ? ? ?Smoking Cessation on??09/08/20.??Recorded by Juanpablo Vaughn??Reason: Patient Refuses  ?     [1]?CT Abdomen and Pelvis W Contrast; Silverio Perez DO 02/10/2020 14:38 CST

## 2022-06-29 PROBLEM — Z86.73 H/O ISCHEMIC LEFT MCA STROKE: Chronic | Status: ACTIVE | Noted: 2022-06-29

## 2022-06-29 PROBLEM — I69.320 APHASIA AS LATE EFFECT OF CEREBROVASCULAR ACCIDENT (CVA): Chronic | Status: ACTIVE | Noted: 2022-06-29

## 2022-06-29 PROBLEM — I10 ESSENTIAL HYPERTENSION: Chronic | Status: ACTIVE | Noted: 2022-06-29

## 2022-06-29 PROBLEM — E78.2 MIXED HYPERLIPIDEMIA: Chronic | Status: ACTIVE | Noted: 2022-06-29

## 2022-06-29 PROBLEM — F01.50 VASCULAR DEMENTIA: Chronic | Status: ACTIVE | Noted: 2022-06-29

## 2022-06-29 PROBLEM — R56.9 SEIZURES: Status: RESOLVED | Noted: 2021-03-29 | Resolved: 2022-06-29

## 2022-06-29 PROBLEM — E11.8 CONTROLLED TYPE 2 DIABETES MELLITUS WITH COMPLICATION, WITHOUT LONG-TERM CURRENT USE OF INSULIN: Chronic | Status: ACTIVE | Noted: 2022-06-29

## 2022-06-29 PROBLEM — G40.209 LOCALIZATION-RELATED SYMPTOMATIC EPILEPSY AND EPILEPTIC SYNDROMES WITH COMPLEX PARTIAL SEIZURES, NOT INTRACTABLE, WITHOUT STATUS EPILEPTICUS: Chronic | Status: ACTIVE | Noted: 2022-06-29

## 2022-06-29 PROBLEM — Z72.0 TOBACCO USE: Chronic | Status: ACTIVE | Noted: 2022-06-29

## 2022-07-25 ENCOUNTER — PATIENT OUTREACH (OUTPATIENT)
Dept: ADMINISTRATIVE | Facility: HOSPITAL | Age: 62
End: 2022-07-25
Payer: MEDICAID

## 2022-07-25 NOTE — PROGRESS NOTES
The following record(s)  below were uploaded for Health Maintenance .   Population Health Outreach.        02.07.2021 DM EYE EXAM          02.12.2020 FIT  KIT

## 2022-09-29 ENCOUNTER — TELEPHONE (OUTPATIENT)
Dept: FAMILY MEDICINE | Facility: CLINIC | Age: 62
End: 2022-09-29
Payer: MEDICAID

## 2023-09-12 ENCOUNTER — TELEPHONE (OUTPATIENT)
Dept: INTERNAL MEDICINE | Facility: CLINIC | Age: 63
End: 2023-09-12
Payer: MEDICAID

## 2024-07-17 ENCOUNTER — OFFICE VISIT (OUTPATIENT)
Dept: URGENT CARE | Facility: CLINIC | Age: 64
End: 2024-07-17
Payer: COMMERCIAL

## 2024-07-17 VITALS
HEIGHT: 70 IN | DIASTOLIC BLOOD PRESSURE: 114 MMHG | TEMPERATURE: 99 F | BODY MASS INDEX: 34.97 KG/M2 | RESPIRATION RATE: 18 BRPM | WEIGHT: 244.25 LBS | SYSTOLIC BLOOD PRESSURE: 169 MMHG | OXYGEN SATURATION: 98 % | HEART RATE: 84 BPM

## 2024-07-17 DIAGNOSIS — B35.3 TINEA PEDIS OF BOTH FEET: Primary | ICD-10-CM

## 2024-07-17 DIAGNOSIS — E11.9 TYPE 2 DIABETES MELLITUS WITHOUT COMPLICATION, UNSPECIFIED WHETHER LONG TERM INSULIN USE: ICD-10-CM

## 2024-07-17 LAB — GLUCOSE SERPL-MCNC: 116 MG/DL (ref 70–110)

## 2024-07-17 PROCEDURE — 82962 GLUCOSE BLOOD TEST: CPT | Mod: PBBFAC | Performed by: FAMILY MEDICINE

## 2024-07-17 PROCEDURE — 99203 OFFICE O/P NEW LOW 30 MIN: CPT | Mod: S$PBB,,, | Performed by: FAMILY MEDICINE

## 2024-07-17 PROCEDURE — 99215 OFFICE O/P EST HI 40 MIN: CPT | Mod: PBBFAC | Performed by: FAMILY MEDICINE

## 2024-07-17 RX ORDER — PRENATAL VIT 91/IRON/FOLIC/DHA 28-975-200
COMBINATION PACKAGE (EA) ORAL 2 TIMES DAILY
Qty: 28.4 G | Refills: 2 | Status: SHIPPED | OUTPATIENT
Start: 2024-07-17

## 2024-07-18 NOTE — PROGRESS NOTES
"Subjective:       Patient ID: Lawrence Osullivan is a 64 y.o. male.    Vitals:  height is 5' 10" (1.778 m) and weight is 110.8 kg (244 lb 4.3 oz). His temperature is 98.5 °F (36.9 °C). His blood pressure is 169/114 (abnormal) and his pulse is 84. His respiration is 18 and oxygen saturation is 98%.     Chief Complaint: Wound Check (Multiple blisters/wounds to bilat feet. Pt states "long time." Out of meds x 3 weeks.)    64-year-old male who has been lost to follow-up with outside PCP, brought here by sister because of rash on both feet that has been present for several years.  States he is out of his medications.  She scheduled a new PCP appointment in August.  He appears to have a history of dementia.  Last labs through [facility] were in 2021.  PCP records are unavailable to me in urgent care.    All other systems are negative    Chart reviewed    Objective:   Physical Exam   Constitutional: He appears well-developed.  Non-toxic appearance. He does not appear ill. No distress.   Cardiovascular: Regular rhythm.   Pulmonary/Chest: Effort normal and breath sounds normal.   Abdominal: Normal appearance. He exhibits no distension. Soft. There is no abdominal tenderness.   Musculoskeletal: Normal range of motion.         General: Normal range of motion.   Neurological: He is alert.   Skin: Skin is not diaphoretic.         Comments: Interdigital and moccasin type tinea pedis changes of both feet.  No obvious secondary infection.   Nursing note and vitals reviewed.    Results for orders placed or performed in visit on 07/17/24   POCT Glucose, Hand-Held Device   Result Value Ref Range    POC Glucose 116 (A) 70 - 110 MG/DL         Assessment:     1. Tinea pedis of both feet    2. Type 2 diabetes mellitus without complication, unspecified whether long term insulin use            Plan:   Patient has bilateral tinea pedis.  Will begin Lamisil, but he may need oral therapy from his PCP.  He has an upcoming PCP appointment in the " beginning of August to get established.  I stressed to his sister there the importance of keeping that appointment.  No medications were restarted at this visit.  No labs were drawn.  We discussed ER precautions.  His sister voiced understanding.  Return to urgent care if needed.      Tinea pedis of both feet  -     terbinafine HCL (LAMISIL) 1 % cream; Apply topically 2 (two) times daily. Use for at least 2 weeks.  Dispense: 28.4 g; Refill: 2    Type 2 diabetes mellitus without complication, unspecified whether long term insulin use  -     POCT Glucose, Hand-Held Device        Please note: This chart was completed via voice to text dictation. It may contain typographical/word recognition errors. If there are any questions, please contact the provider for final clarification.

## 2024-08-05 ENCOUNTER — OFFICE VISIT (OUTPATIENT)
Dept: INTERNAL MEDICINE | Facility: CLINIC | Age: 64
End: 2024-08-05
Payer: COMMERCIAL

## 2024-08-05 VITALS
OXYGEN SATURATION: 100 % | WEIGHT: 243.81 LBS | RESPIRATION RATE: 20 BRPM | BODY MASS INDEX: 34.98 KG/M2 | HEART RATE: 83 BPM | TEMPERATURE: 98 F | SYSTOLIC BLOOD PRESSURE: 153 MMHG | DIASTOLIC BLOOD PRESSURE: 107 MMHG

## 2024-08-05 DIAGNOSIS — Z12.11 SCREENING FOR COLORECTAL CANCER: ICD-10-CM

## 2024-08-05 DIAGNOSIS — Z23 NEED FOR VACCINATION: ICD-10-CM

## 2024-08-05 DIAGNOSIS — G40.909 SEIZURE DISORDER: ICD-10-CM

## 2024-08-05 DIAGNOSIS — Z72.0 TOBACCO USE: Chronic | ICD-10-CM

## 2024-08-05 DIAGNOSIS — E11.9 TYPE 2 DIABETES MELLITUS WITHOUT COMPLICATION, UNSPECIFIED WHETHER LONG TERM INSULIN USE: ICD-10-CM

## 2024-08-05 DIAGNOSIS — E78.2 MIXED HYPERLIPIDEMIA: Chronic | ICD-10-CM

## 2024-08-05 DIAGNOSIS — E11.8 CONTROLLED TYPE 2 DIABETES MELLITUS WITH COMPLICATION, WITHOUT LONG-TERM CURRENT USE OF INSULIN: Chronic | ICD-10-CM

## 2024-08-05 DIAGNOSIS — J45.909 ASTHMA, UNSPECIFIED ASTHMA SEVERITY, UNSPECIFIED WHETHER COMPLICATED, UNSPECIFIED WHETHER PERSISTENT: Primary | ICD-10-CM

## 2024-08-05 DIAGNOSIS — B35.3 TINEA PEDIS OF BOTH FEET: ICD-10-CM

## 2024-08-05 DIAGNOSIS — I10 ESSENTIAL HYPERTENSION: Chronic | ICD-10-CM

## 2024-08-05 DIAGNOSIS — K22.10 ULCERATIVE ESOPHAGITIS: ICD-10-CM

## 2024-08-05 DIAGNOSIS — Z11.3 SCREENING EXAMINATION FOR STI: ICD-10-CM

## 2024-08-05 DIAGNOSIS — Z87.891 PERSONAL HISTORY OF NICOTINE DEPENDENCE: ICD-10-CM

## 2024-08-05 DIAGNOSIS — Z12.12 SCREENING FOR COLORECTAL CANCER: ICD-10-CM

## 2024-08-05 PROCEDURE — 90677 PCV20 VACCINE IM: CPT | Mod: PBBFAC

## 2024-08-05 PROCEDURE — G0009 ADMIN PNEUMOCOCCAL VACCINE: HCPCS | Mod: PBBFAC

## 2024-08-05 PROCEDURE — 99215 OFFICE O/P EST HI 40 MIN: CPT | Mod: PBBFAC | Performed by: STUDENT IN AN ORGANIZED HEALTH CARE EDUCATION/TRAINING PROGRAM

## 2024-08-05 RX ORDER — PANTOPRAZOLE SODIUM 40 MG/1
40 TABLET, DELAYED RELEASE ORAL DAILY
Qty: 90 TABLET | Refills: 3 | Status: SHIPPED | OUTPATIENT
Start: 2024-08-05 | End: 2025-08-05

## 2024-08-05 RX ORDER — SITAGLIPTIN AND METFORMIN HYDROCHLORIDE 50; 500 MG/1; MG/1
1 TABLET, FILM COATED, EXTENDED RELEASE ORAL DAILY
Qty: 180 TABLET | Refills: 3 | Status: SHIPPED | OUTPATIENT
Start: 2024-08-05 | End: 2025-08-05

## 2024-08-05 RX ORDER — FUROSEMIDE 40 MG/1
20 TABLET ORAL DAILY PRN
Qty: 30 TABLET | Refills: 1 | Status: SHIPPED | OUTPATIENT
Start: 2024-08-05 | End: 2024-10-04

## 2024-08-05 RX ORDER — VENLAFAXINE HYDROCHLORIDE 75 MG/1
75 CAPSULE, EXTENDED RELEASE ORAL DAILY
Qty: 30 CAPSULE | Refills: 11 | Status: SHIPPED | OUTPATIENT
Start: 2024-08-05 | End: 2025-08-05

## 2024-08-05 RX ORDER — ALBUTEROL SULFATE 90 UG/1
1 INHALANT RESPIRATORY (INHALATION) EVERY 6 HOURS PRN
Qty: 18 G | Refills: 6 | Status: SHIPPED | OUTPATIENT
Start: 2024-08-05

## 2024-08-05 RX ORDER — LEVETIRACETAM 500 MG/1
500 TABLET ORAL 2 TIMES DAILY
Qty: 180 TABLET | Refills: 3 | Status: SHIPPED | OUTPATIENT
Start: 2024-08-05 | End: 2025-08-05

## 2024-08-05 RX ORDER — FLUTICASONE PROPIONATE AND SALMETEROL XINAFOATE 115; 21 UG/1; UG/1
2 AEROSOL, METERED RESPIRATORY (INHALATION) DAILY
Qty: 12 G | Refills: 5 | Status: SHIPPED | OUTPATIENT
Start: 2024-08-05 | End: 2025-08-05

## 2024-08-05 RX ORDER — LOSARTAN POTASSIUM 100 MG/1
100 TABLET ORAL DAILY
Qty: 90 TABLET | Refills: 3 | Status: SHIPPED | OUTPATIENT
Start: 2024-08-05 | End: 2025-08-05

## 2024-08-05 RX ORDER — INSULIN PUMP SYRINGE, 3 ML
EACH MISCELLANEOUS
Qty: 1 EACH | Refills: 0 | Status: SHIPPED | OUTPATIENT
Start: 2024-08-05 | End: 2025-08-05

## 2024-08-05 RX ORDER — METOPROLOL SUCCINATE 50 MG/1
50 TABLET, EXTENDED RELEASE ORAL DAILY
Qty: 90 TABLET | Refills: 3 | Status: SHIPPED | OUTPATIENT
Start: 2024-08-05 | End: 2025-08-05

## 2024-08-05 RX ORDER — CLOPIDOGREL BISULFATE 75 MG/1
75 TABLET ORAL DAILY
Qty: 90 TABLET | Refills: 3 | Status: SHIPPED | OUTPATIENT
Start: 2024-08-05 | End: 2025-08-05

## 2024-08-05 RX ORDER — ATORVASTATIN CALCIUM 40 MG/1
40 TABLET, FILM COATED ORAL DAILY
Qty: 90 TABLET | Refills: 3 | Status: SHIPPED | OUTPATIENT
Start: 2024-08-05 | End: 2025-08-05

## 2024-08-05 RX ADMIN — PNEUMOCOCCAL 20-VALENT CONJUGATE VACCINE 0.5 ML
2.2; 2.2; 2.2; 2.2; 2.2; 2.2; 2.2; 2.2; 2.2; 2.2; 2.2; 2.2; 2.2; 2.2; 2.2; 2.2; 4.4; 2.2; 2.2; 2.2 INJECTION, SUSPENSION INTRAMUSCULAR at 09:08

## 2024-10-31 RX ORDER — FUROSEMIDE 40 MG/1
20 TABLET ORAL DAILY PRN
Qty: 30 TABLET | Refills: 1 | Status: CANCELLED | OUTPATIENT
Start: 2024-10-31 | End: 2024-12-30

## 2025-01-13 RX ORDER — FUROSEMIDE 40 MG/1
20 TABLET ORAL DAILY PRN
Qty: 30 TABLET | Refills: 1 | OUTPATIENT
Start: 2025-01-13 | End: 2025-03-14

## 2025-01-14 NOTE — TELEPHONE ENCOUNTER
Furosemide was discontinued in October 2024 given resolution of lower extremity edema. If patient reports new lower extremity swelling, we will evaluation and consider restarting furosemide.

## 2025-03-17 DIAGNOSIS — E11.8 CONTROLLED TYPE 2 DIABETES MELLITUS WITH COMPLICATION, WITHOUT LONG-TERM CURRENT USE OF INSULIN: Chronic | ICD-10-CM

## 2025-03-17 DIAGNOSIS — J45.909 ASTHMA, UNSPECIFIED ASTHMA SEVERITY, UNSPECIFIED WHETHER COMPLICATED, UNSPECIFIED WHETHER PERSISTENT: ICD-10-CM

## 2025-03-17 DIAGNOSIS — B35.3 TINEA PEDIS OF BOTH FEET: ICD-10-CM

## 2025-03-17 RX ORDER — PRENATAL VIT 91/IRON/FOLIC/DHA 28-975-200
COMBINATION PACKAGE (EA) ORAL 2 TIMES DAILY
Qty: 28.4 G | Refills: 2 | OUTPATIENT
Start: 2025-03-17

## 2025-03-17 RX ORDER — VENLAFAXINE HYDROCHLORIDE 75 MG/1
75 CAPSULE, EXTENDED RELEASE ORAL DAILY
Qty: 30 CAPSULE | Refills: 11 | OUTPATIENT
Start: 2025-03-17 | End: 2026-03-17

## 2025-03-17 RX ORDER — LEVETIRACETAM 500 MG/1
500 TABLET ORAL 2 TIMES DAILY
Qty: 180 TABLET | Refills: 3 | OUTPATIENT
Start: 2025-03-17 | End: 2026-03-17

## 2025-03-17 RX ORDER — LOSARTAN POTASSIUM 100 MG/1
100 TABLET ORAL DAILY
Qty: 90 TABLET | Refills: 3 | OUTPATIENT
Start: 2025-03-17 | End: 2026-03-17

## 2025-03-17 RX ORDER — FLUTICASONE PROPIONATE AND SALMETEROL XINAFOATE 115; 21 UG/1; UG/1
2 AEROSOL, METERED RESPIRATORY (INHALATION) DAILY
Qty: 12 G | Refills: 5 | OUTPATIENT
Start: 2025-03-17 | End: 2026-03-17

## 2025-03-17 RX ORDER — ATORVASTATIN CALCIUM 40 MG/1
40 TABLET, FILM COATED ORAL DAILY
Qty: 90 TABLET | Refills: 3 | OUTPATIENT
Start: 2025-03-17 | End: 2026-03-17

## 2025-03-17 RX ORDER — PANTOPRAZOLE SODIUM 40 MG/1
40 TABLET, DELAYED RELEASE ORAL DAILY
Qty: 90 TABLET | Refills: 3 | OUTPATIENT
Start: 2025-03-17 | End: 2026-03-17

## 2025-03-17 RX ORDER — METOPROLOL SUCCINATE 50 MG/1
50 TABLET, EXTENDED RELEASE ORAL DAILY
Qty: 90 TABLET | Refills: 3 | OUTPATIENT
Start: 2025-03-17 | End: 2026-03-17

## 2025-03-17 RX ORDER — FUROSEMIDE 40 MG/1
20 TABLET ORAL DAILY PRN
Qty: 30 TABLET | Refills: 1 | OUTPATIENT
Start: 2025-03-17 | End: 2025-05-16

## 2025-03-17 RX ORDER — SITAGLIPTIN AND METFORMIN HYDROCHLORIDE 50; 500 MG/1; MG/1
1 TABLET, FILM COATED, EXTENDED RELEASE ORAL DAILY
Qty: 180 TABLET | Refills: 3 | OUTPATIENT
Start: 2025-03-17 | End: 2026-03-17

## 2025-03-17 RX ORDER — ALBUTEROL SULFATE 90 UG/1
1 INHALANT RESPIRATORY (INHALATION) EVERY 6 HOURS PRN
Qty: 18 G | Refills: 6 | OUTPATIENT
Start: 2025-03-17

## 2025-03-17 RX ORDER — INSULIN PUMP SYRINGE, 3 ML
EACH MISCELLANEOUS
Qty: 1 EACH | Refills: 0 | OUTPATIENT
Start: 2025-03-17 | End: 2026-03-17

## 2025-03-17 RX ORDER — CLOPIDOGREL BISULFATE 75 MG/1
75 TABLET ORAL DAILY
Qty: 90 TABLET | Refills: 3 | OUTPATIENT
Start: 2025-03-17 | End: 2026-03-17

## 2025-03-18 ENCOUNTER — LAB VISIT (OUTPATIENT)
Dept: LAB | Facility: HOSPITAL | Age: 65
End: 2025-03-18
Attending: STUDENT IN AN ORGANIZED HEALTH CARE EDUCATION/TRAINING PROGRAM
Payer: COMMERCIAL

## 2025-03-18 ENCOUNTER — OFFICE VISIT (OUTPATIENT)
Dept: INTERNAL MEDICINE | Facility: CLINIC | Age: 65
End: 2025-03-18
Payer: COMMERCIAL

## 2025-03-18 VITALS
RESPIRATION RATE: 20 BRPM | BODY MASS INDEX: 36.59 KG/M2 | DIASTOLIC BLOOD PRESSURE: 96 MMHG | TEMPERATURE: 99 F | OXYGEN SATURATION: 95 % | HEART RATE: 82 BPM | WEIGHT: 255 LBS | SYSTOLIC BLOOD PRESSURE: 160 MMHG

## 2025-03-18 DIAGNOSIS — N40.0 BENIGN PROSTATIC HYPERPLASIA, UNSPECIFIED WHETHER LOWER URINARY TRACT SYMPTOMS PRESENT: ICD-10-CM

## 2025-03-18 DIAGNOSIS — Z12.11 COLON CANCER SCREENING: ICD-10-CM

## 2025-03-18 DIAGNOSIS — Z11.3 SCREENING EXAMINATION FOR STI: ICD-10-CM

## 2025-03-18 DIAGNOSIS — F32.A DEPRESSION, UNSPECIFIED DEPRESSION TYPE: ICD-10-CM

## 2025-03-18 DIAGNOSIS — F41.9 ANXIETY: ICD-10-CM

## 2025-03-18 DIAGNOSIS — E11.8 CONTROLLED TYPE 2 DIABETES MELLITUS WITH COMPLICATION, WITHOUT LONG-TERM CURRENT USE OF INSULIN: Primary | Chronic | ICD-10-CM

## 2025-03-18 DIAGNOSIS — Z12.5 ENCOUNTER FOR SCREENING FOR MALIGNANT NEOPLASM OF PROSTATE: ICD-10-CM

## 2025-03-18 DIAGNOSIS — G62.9 PERIPHERAL POLYNEUROPATHY: ICD-10-CM

## 2025-03-18 DIAGNOSIS — Z12.11 SCREENING FOR COLORECTAL CANCER: ICD-10-CM

## 2025-03-18 DIAGNOSIS — E11.9 TYPE 2 DIABETES MELLITUS WITHOUT COMPLICATION, UNSPECIFIED WHETHER LONG TERM INSULIN USE: ICD-10-CM

## 2025-03-18 DIAGNOSIS — E11.8 CONTROLLED TYPE 2 DIABETES MELLITUS WITH COMPLICATION, WITHOUT LONG-TERM CURRENT USE OF INSULIN: Primary | ICD-10-CM

## 2025-03-18 DIAGNOSIS — Z12.12 SCREENING FOR COLORECTAL CANCER: ICD-10-CM

## 2025-03-18 DIAGNOSIS — G40.909 SEIZURE DISORDER: ICD-10-CM

## 2025-03-18 DIAGNOSIS — E11.8 CONTROLLED TYPE 2 DIABETES MELLITUS WITH COMPLICATION, WITHOUT LONG-TERM CURRENT USE OF INSULIN: ICD-10-CM

## 2025-03-18 DIAGNOSIS — L60.2 ONYCHAUXIS: ICD-10-CM

## 2025-03-18 DIAGNOSIS — J32.9 RHINOSINUSITIS: ICD-10-CM

## 2025-03-18 LAB
ALBUMIN SERPL-MCNC: 3.6 G/DL (ref 3.4–4.8)
ALBUMIN/GLOB SERPL: 0.9 RATIO (ref 1.1–2)
ALP SERPL-CCNC: 80 UNIT/L (ref 40–150)
ALT SERPL-CCNC: 13 UNIT/L (ref 0–55)
ANION GAP SERPL CALC-SCNC: 9 MEQ/L
AST SERPL-CCNC: 13 UNIT/L (ref 5–34)
BASOPHILS # BLD AUTO: 0.04 X10(3)/MCL
BASOPHILS NFR BLD AUTO: 0.5 %
BILIRUB SERPL-MCNC: 0.4 MG/DL
BUN SERPL-MCNC: 6.2 MG/DL (ref 8.4–25.7)
CALCIUM SERPL-MCNC: 9.3 MG/DL (ref 8.8–10)
CHLORIDE SERPL-SCNC: 102 MMOL/L (ref 98–107)
CHOLEST SERPL-MCNC: 200 MG/DL
CHOLEST/HDLC SERPL: 5 {RATIO} (ref 0–5)
CO2 SERPL-SCNC: 29 MMOL/L (ref 23–31)
CREAT SERPL-MCNC: 1.09 MG/DL (ref 0.72–1.25)
CREAT UR-MCNC: 39.4 MG/DL (ref 63–166)
CREAT/UREA NIT SERPL: 6
EOSINOPHIL # BLD AUTO: 0.22 X10(3)/MCL (ref 0–0.9)
EOSINOPHIL NFR BLD AUTO: 2.8 %
ERYTHROCYTE [DISTWIDTH] IN BLOOD BY AUTOMATED COUNT: 14.5 % (ref 11.5–17)
EST. AVERAGE GLUCOSE BLD GHB EST-MCNC: 119.8 MG/DL
GFR SERPLBLD CREATININE-BSD FMLA CKD-EPI: >60 ML/MIN/1.73/M2
GLOBULIN SER-MCNC: 4.2 GM/DL (ref 2.4–3.5)
GLUCOSE SERPL-MCNC: 112 MG/DL (ref 82–115)
HAV IGM SERPL QL IA: NONREACTIVE
HBA1C MFR BLD: 5.8 %
HBA1C MFR BLD: 6.2 %
HBV CORE IGM SERPL QL IA: NONREACTIVE
HBV SURFACE AG SERPL QL IA: NONREACTIVE
HCT VFR BLD AUTO: 44.2 % (ref 42–52)
HCV AB SERPL QL IA: NONREACTIVE
HDLC SERPL-MCNC: 41 MG/DL (ref 35–60)
HGB BLD-MCNC: 13.8 G/DL (ref 14–18)
HIV 1+2 AB+HIV1 P24 AG SERPL QL IA: NONREACTIVE
IMM GRANULOCYTES # BLD AUTO: 0.01 X10(3)/MCL (ref 0–0.04)
IMM GRANULOCYTES NFR BLD AUTO: 0.1 %
LDLC SERPL CALC-MCNC: 121 MG/DL (ref 50–140)
LYMPHOCYTES # BLD AUTO: 1.48 X10(3)/MCL (ref 0.6–4.6)
LYMPHOCYTES NFR BLD AUTO: 18.8 %
MCH RBC QN AUTO: 27.9 PG (ref 27–31)
MCHC RBC AUTO-ENTMCNC: 31.2 G/DL (ref 33–36)
MCV RBC AUTO: 89.5 FL (ref 80–94)
MICROALBUMIN UR-MCNC: 20.7 UG/ML
MICROALBUMIN/CREAT RATIO PNL UR: 52.5 MG/GM CR (ref 0–30)
MONOCYTES # BLD AUTO: 0.61 X10(3)/MCL (ref 0.1–1.3)
MONOCYTES NFR BLD AUTO: 7.7 %
NEUTROPHILS # BLD AUTO: 5.53 X10(3)/MCL (ref 2.1–9.2)
NEUTROPHILS NFR BLD AUTO: 70.1 %
NRBC BLD AUTO-RTO: 0 %
PLATELET # BLD AUTO: 267 X10(3)/MCL (ref 130–400)
PMV BLD AUTO: 9.2 FL (ref 7.4–10.4)
POTASSIUM SERPL-SCNC: 3.9 MMOL/L (ref 3.5–5.1)
PROT SERPL-MCNC: 7.8 GM/DL (ref 5.8–7.6)
PSA SERPL-MCNC: 0.66 NG/ML
RBC # BLD AUTO: 4.94 X10(6)/MCL (ref 4.7–6.1)
SODIUM SERPL-SCNC: 140 MMOL/L (ref 136–145)
T PALLIDUM AB SER QL: NONREACTIVE
TRIGL SERPL-MCNC: 191 MG/DL (ref 34–140)
VLDLC SERPL CALC-MCNC: 38 MG/DL
WBC # BLD AUTO: 7.89 X10(3)/MCL (ref 4.5–11.5)

## 2025-03-18 PROCEDURE — 80053 COMPREHEN METABOLIC PANEL: CPT

## 2025-03-18 PROCEDURE — 86780 TREPONEMA PALLIDUM: CPT

## 2025-03-18 PROCEDURE — 82570 ASSAY OF URINE CREATININE: CPT

## 2025-03-18 PROCEDURE — 84153 ASSAY OF PSA TOTAL: CPT

## 2025-03-18 PROCEDURE — 36415 COLL VENOUS BLD VENIPUNCTURE: CPT

## 2025-03-18 PROCEDURE — 83036 HEMOGLOBIN GLYCOSYLATED A1C: CPT

## 2025-03-18 PROCEDURE — 80074 ACUTE HEPATITIS PANEL: CPT

## 2025-03-18 PROCEDURE — 85025 COMPLETE CBC W/AUTO DIFF WBC: CPT

## 2025-03-18 PROCEDURE — 80177 DRUG SCRN QUAN LEVETIRACETAM: CPT

## 2025-03-18 PROCEDURE — 99215 OFFICE O/P EST HI 40 MIN: CPT | Mod: PBBFAC | Performed by: STUDENT IN AN ORGANIZED HEALTH CARE EDUCATION/TRAINING PROGRAM

## 2025-03-18 PROCEDURE — 83036 HEMOGLOBIN GLYCOSYLATED A1C: CPT | Mod: PBBFAC | Performed by: STUDENT IN AN ORGANIZED HEALTH CARE EDUCATION/TRAINING PROGRAM

## 2025-03-18 PROCEDURE — 80061 LIPID PANEL: CPT

## 2025-03-18 PROCEDURE — 87389 HIV-1 AG W/HIV-1&-2 AB AG IA: CPT

## 2025-03-18 RX ORDER — FUROSEMIDE 40 MG/1
20 TABLET ORAL DAILY PRN
Qty: 30 TABLET | Refills: 1 | Status: SHIPPED | OUTPATIENT
Start: 2025-03-18 | End: 2025-05-17

## 2025-03-18 RX ORDER — TAMSULOSIN HYDROCHLORIDE 0.4 MG/1
0.4 CAPSULE ORAL DAILY
Qty: 30 CAPSULE | Refills: 11 | Status: SHIPPED | OUTPATIENT
Start: 2025-03-18 | End: 2026-03-18

## 2025-03-18 RX ORDER — FLUTICASONE PROPIONATE 50 MCG
1 SPRAY, SUSPENSION (ML) NASAL DAILY
Qty: 16 G | Refills: 2 | Status: SHIPPED | OUTPATIENT
Start: 2025-03-18

## 2025-03-18 RX ORDER — NIFEDIPINE 30 MG/1
30 TABLET, EXTENDED RELEASE ORAL DAILY
Qty: 30 TABLET | Refills: 11 | Status: SHIPPED | OUTPATIENT
Start: 2025-03-18 | End: 2026-03-18

## 2025-03-18 NOTE — PROGRESS NOTES
[facility], formerly [facility] INTERNAL MEDICINE  OUTPATIENT OFFICE VISIT NOTE    SUBJECTIVE:      Chief Complaint: Follow-up (Coughing  a lot x's months- bring up mucus sometimes. Urinating a lot daily 10-12 times a day. Dark area around ankles)       HPI: Lawrence Osullivan is a 64 y.o. yo male w/ PMH of asthma, tobacco use, CVA in 2013 with right sided deficits, HTN, HLD, T2DM, seizure disorder (last seizure in 2019), and tinea pedis, who presents for follow up.    Last seen in 8/2024 when he initially established care in  Clinic.     Past Medical History:   has a past medical history of Acute myocardial infarction, CVA (cerebral vascular accident), Dyslipidemia, Expressive aphasia, HTN (hypertension), Obesity, unspecified, Seizure disorder, and Type 2 diabetes mellitus without complications.     Past Surgical History:   has a past surgical history that includes Back surgery; Cardiac pacemaker placement; and Placement Of Naso Orogastric Tube.     Family History:  family history includes Cataracts in his father and mother; Colon cancer in his brother and father; Diabetes in his father; Heart disease in his father, mother, and sister; Hypertension in his brother and father; Hyperthyroidism in his sister; Osteoporosis in his mother and sister; Pacemaker/defibrilator in his father; Peripheral vascular disease in his father, mother, and sister.     Social History:   reports that he has been smoking cigarettes. He started smoking about 50 years ago. He has a 100.4 pack-year smoking history. He has never used smokeless tobacco. He reports that he does not currently use alcohol. He reports that he does not use drugs.     Allergies:  is allergic to asa [aspirin], iodinated contrast media, and pcn [penicillins].     ROS:  (+) Chronic right sided weakness, frequent urination, nocturia  (-) Chest pain, SOB, abdominal pain     OBJECTIVE:     Vital signs:   BP (!) 160/96 (BP Location: Left arm, Patient Position: Sitting)   Pulse 82   Temp 98.8 °F  (37.1 °C) (Oral)   Resp 20   Wt 115.7 kg (255 lb)   SpO2 95%   BMI 36.59 kg/m²      Physical Examination:  General: Sitting up in chair with no acute distress, appears comfortable  HENT: Head-normocephalic and atraumatic  Respiratory: clear to auscultation bilaterally  Cardiovascular: regular rate and without murmurs  Gastrointestinal: soft, non-tender, non-distended  Musculoskeletal: 1+ pitting edema BLE  Integumentary: thickened dry scaly feet with white residue from powder  Neurologic: PERI HERNANDEZ 4/5 motor, mild delay in response but speech clear and appropriate, oriented  Psychiatric: cognitive function intact, cooperative with exam    Diabetic foot exam 3/2025:   Left: Abnormal monofilament exam, 1+ dp/pt, hyperpigmentation scab on inner foot, long thick toenails   Right: Abnormal monofilament exam, 1+ dp/pt, hyperpigmentation scab on inner foot, long thick toenails     ASSESSMENT & PLAN:       Hx CVA 2013  - Residual right sided weakness, delayed verbal response but intelligible   - Patient reports he follows with outside Neurology and recommends continuing plavix  - Resume plavix and statin    HTN  - /96  - Continue losartan 100 mg daily and Toprol 50 mg daily  - Start nifedipine 30 mg daily  - Follow up in 4 weeks  - Smoking cessation    T2DM   Peripheral neuropathy  - A1c 6.2 in 3/2025  - DM eye exam and foot exam 3/2025   - Continue Janumet XR  mg daily  - Refer to Wound Care for diabetic foot care    HLD  - Follow up lipid panel  - Resume atorvastatin 40 mg daily    Seizure disorder  - Check keppra level  - Resume Keppra 500 mg BID      GERD  Severe Ulcerative Esophagitis   - Occasional heartburn with meals  - Resume protonix 40 mg daily   - Previously seen by GI, last visit 2/2020  - EGD in 2/2020: severe ulcerative esophagitis    Tachy/helio syndrome  - Pacemaker in place    Frequent urination  - Reports frequent urination for 2 years  - Family history of prostate cancer, patient's father  diagnosed at age 58  - Refer to Urology  - Start flomax, check PSA    Depression  Anxiety  - Continue venlafaxine 75 mg daily  - Refer to [facility] maintenance   labs (CBC, CMP, FLP, A1c, TSH, vitD): Ordered 8/2024  HIV/Hep panel/syphilis: Ordered 8/2024  Pneumococcal vaccine: PCV20 in 8/2024  Tdap: Done 2/2021  Zoster vaccine: discuss at next visit  Flu: discuss at next visit, not in season currently  FIT/colonoscopy: Cologuard ordered 8/2024  Lung cancer screening (LDCT age 50-80): LDCT ordered 8/2024, NO SHOW LDCT on 8/26/2024 at 7:30 AM  AAA screening (men, age 65-75): US Abdomen ordered 8/2024      Return to clinic in 4 weeks for BP check       Myrtle Dupree MD  [facility] Internal Medicine, PR[facility]  3/18/2025

## 2025-03-19 NOTE — PROGRESS NOTES
Attending physician addendum-  Patient discussed in clinic with the resident.   Care provided is appropriate.   Patient seen face to face in clinic.

## 2025-03-20 LAB — LEVETIRACETAM SERPL-MCNC: <1 MCG/ML (ref 10–40)

## 2025-04-04 RX ORDER — FUROSEMIDE 40 MG/1
20 TABLET ORAL DAILY PRN
Qty: 30 TABLET | Refills: 1 | Status: SHIPPED | OUTPATIENT
Start: 2025-04-04 | End: 2025-06-03

## 2025-04-09 ENCOUNTER — OFFICE VISIT (OUTPATIENT)
Dept: UROLOGY | Facility: CLINIC | Age: 65
End: 2025-04-09
Payer: COMMERCIAL

## 2025-04-09 VITALS
HEART RATE: 77 BPM | OXYGEN SATURATION: 97 % | WEIGHT: 246.81 LBS | BODY MASS INDEX: 35.33 KG/M2 | SYSTOLIC BLOOD PRESSURE: 113 MMHG | RESPIRATION RATE: 20 BRPM | TEMPERATURE: 98 F | DIASTOLIC BLOOD PRESSURE: 82 MMHG | HEIGHT: 70 IN

## 2025-04-09 DIAGNOSIS — N32.81 OAB (OVERACTIVE BLADDER): ICD-10-CM

## 2025-04-09 DIAGNOSIS — N40.0 BENIGN PROSTATIC HYPERPLASIA, UNSPECIFIED WHETHER LOWER URINARY TRACT SYMPTOMS PRESENT: ICD-10-CM

## 2025-04-09 DIAGNOSIS — N32.81 OAB (OVERACTIVE BLADDER): Primary | ICD-10-CM

## 2025-04-09 LAB
BILIRUB SERPL-MCNC: NORMAL MG/DL
BLOOD URINE, POC: NORMAL
COLOR, POC UA: YELLOW
GLUCOSE UR QL STRIP: NORMAL
KETONES UR QL STRIP: NORMAL
LEUKOCYTE ESTERASE URINE, POC: NORMAL
NITRITE, POC UA: NORMAL
PH, POC UA: 5.5
PROTEIN, POC: NORMAL
SPECIFIC GRAVITY, POC UA: 1.01
UROBILINOGEN, POC UA: 0.2

## 2025-04-09 PROCEDURE — 81001 URINALYSIS AUTO W/SCOPE: CPT | Mod: PBBFAC | Performed by: NURSE PRACTITIONER

## 2025-04-09 PROCEDURE — 99214 OFFICE O/P EST MOD 30 MIN: CPT | Mod: PBBFAC | Performed by: NURSE PRACTITIONER

## 2025-04-09 RX ORDER — MIRABEGRON 25 MG/1
50 TABLET, FILM COATED, EXTENDED RELEASE ORAL DAILY
Qty: 180 TABLET | Refills: 3 | Status: SHIPPED | OUTPATIENT
Start: 2025-04-09

## 2025-04-09 RX ORDER — MIRABEGRON 50 MG/1
50 TABLET, FILM COATED, EXTENDED RELEASE ORAL DAILY
Qty: 90 TABLET | Refills: 3 | Status: SHIPPED | OUTPATIENT
Start: 2025-04-09 | End: 2025-04-09

## 2025-04-09 NOTE — PROGRESS NOTES
Placed in room. Seen by Magdi Garcia NP. Spoke with patient. Bladder scan at 44 ml.  RTC in 1 month.

## 2025-04-09 NOTE — PROGRESS NOTES
Chief Complaint: No chief complaint on file.      HPI:   Patient is a 64-year-old male referred to Urology for BPH and urinary frequency  Patient seen by PCP on 03/18/2025 with reports frequent urination for 2 years. Family history of prostate cancer, patient's father diagnosed at age 55.  Patient's PMHX: CVA 14 yrs ago, HTN DM-II, Epilepsy,  EmphysemaDementia, Hx. Smoking 40+ yrs 2 PPD, Bradicardi-pacemaker.  Today Urinary freq. 7-8 X per day, 4 x per night.   Bladder Scan 44 ml.      Allergies:  Review of patient's allergies indicates:   Allergen Reactions    Asa [aspirin] Hives    Iodinated contrast media Hives    Pcn [penicillins] Hives       Medications:  Current Medications[1]    Review of Systems:  General: No fever, chills, fatigability, or weight loss.  Skin: No rashes, itching, or changes in color or texture of skin.  Chest: Denies LOERA, cyanosis, wheezing, cough, and sputum production.  Abdomen: Appetite fine. No weight loss. Denies diarrhea, abdominal pain, hematemesis, or blood in stool.  Musculoskeletal: No joint stiffness or swelling. Denies back pain.  : As above.  All other review of systems negative.    PMH:  Past Medical History:   Diagnosis Date    Acute myocardial infarction     CVA (cerebral vascular accident)     Dyslipidemia     Expressive aphasia     HTN (hypertension)     Obesity, unspecified     Seizure disorder     Type 2 diabetes mellitus without complications        PSH:  Past Surgical History:   Procedure Laterality Date    BACK SURGERY      CARDIAC PACEMAKER PLACEMENT      Placement Of Naso Orogastric Tube         FamHx:  Family History   Problem Relation Name Age of Onset    Cataracts Mother      Heart disease Mother      Osteoporosis Mother      Peripheral vascular disease Mother      Peripheral vascular disease Father      Hypertension Father      Heart disease Father      Diabetes Father      Cataracts Father      Pacemaker/defibrilator Father      Colon cancer Father       Peripheral vascular disease Sister      Osteoporosis Sister      Heart disease Sister      Hyperthyroidism Sister      Colon cancer Brother      Hypertension Brother         SocHx:  Social History[2]    Physical Exam:  There were no vitals filed for this visit.  General: A&Ox3, no apparent distress, no deformities  Neck: No masses, normal thyroid  Lungs: CTA wayne, no use of accessory muscles  Heart: RRR, no arrhythmias  Abdomen: Soft, NT, ND, no masses, no hernias, no hepatosplenomegaly  Lymphatic: Neck and groin nodes negative  Skin: The skin is warm and dry. No jaundice.  Ext: No c/c/e.    GUMale: Test desc wayne, no abnormalities of epididymus. Penis circumcised, with normal penile and scrotal skin. Meatus normal. Normal rectal tone, no hemorrhoids. Prostate: 2 finger breadth wide, flat, smooth, soft, nontender, no nodules or masses appreciated. SV not palpable. Perineum and anus normal.      Labs:    Latest Reference Range & Units 03/18/25 10:53   Prostate Specific Antigen <=4.00 ng/mL 0.66           Urinalysis:  Component  Ref Range & Units (hover) 13:24   Color, UA Yellow   Spec Grav UA 1.015   pH, UA 5.5   WBC, UA neg   Nitrite, UA neg   Protein, POC neg   Glucose, UA neg   Ketones, UA neg   Urobilinogen, UA 0.2   Bilirubin, POC neg   Blood, UA neg      Microscopic Urinalysis Neg. WBC's, CBC, Nitrites       Specimen Collected: 04/09/25 13:24 CDT Last Resulted: 04/09/25 13:24 CDT            Impression:  1. Benign prostatic hyperplasia, unspecified whether lower urinary tract symptoms present  - Ambulatory referral/consult to Urology  2. OAB    Plan:  Instructed patient to Start Myrbetriq 50 mg po daily.  Instructed patient on timed voiding every 2-3 hrs, double voiding, avoidance of bladder irritants such as alcohol, citrus foods, chocolate, caffeinated drinks, energy drinks, spicy foods, sugar, caffeine products, sodas. Instructed patient to avoid drinking fluids 1-2 hours prior to bedtime & void immediately  before bedtime.   RTC 1 month for eval.  Instructed patient if develops any abnormal urologic symptoms notify clinic to be re-evaluate treated or during after hours go to emergency room versus urgent here.                           [facility]         [1]   Current Outpatient Medications   Medication Sig Dispense Refill    albuterol (PROVENTIL/VENTOLIN HFA) 90 mcg/actuation inhaler Inhale 1 puff into the lungs every 6 (six) hours as needed for Wheezing or Shortness of Breath. Rescue 18 g 6    atorvastatin (LIPITOR) 40 MG tablet Take 1 tablet (40 mg total) by mouth once daily. 90 tablet 3    blood-glucose meter kit Use as instructed (Patient not taking: Reported on 3/18/2025) 1 each 0    clopidogreL (PLAVIX) 75 mg tablet Take 1 tablet (75 mg total) by mouth once daily. 90 tablet 3    cyanocobalamin (VITAMIN B-12) 1000 MCG tablet Take 1,000 mcg by mouth once daily. (Patient not taking: Reported on 3/18/2025)      donepeziL (ARICEPT) 5 MG tablet       ferrous sulfate (FEOSOL) 325 mg (65 mg iron) Tab tablet Take by mouth once daily. (Patient not taking: Reported on 3/18/2025)      fluticasone propion-salmeterol 115-21 mcg/dose (ADVAIR HFA) 115-21 mcg/actuation HFAA inhaler Inhale 2 puffs into the lungs once daily. Controller (Patient not taking: Reported on 3/18/2025) 12 g 5    fluticasone propionate (FLONASE) 50 mcg/actuation nasal spray 1 spray (50 mcg total) by Each Nostril route once daily. 16 g 2    furosemide (LASIX) 40 MG tablet Take 0.5 tablets (20 mg total) by mouth daily as needed (Leg swelling). 30 tablet 1    JANUMET XR  mg TM24 Take 1 tablet by mouth once daily. 180 tablet 3    levETIRAcetam (KEPPRA) 500 MG Tab Take 1 tablet (500 mg total) by mouth 2 (two) times daily. 180 tablet 3    losartan (COZAAR) 100 MG tablet Take 1 tablet (100 mg total) by mouth once daily. 90 tablet 3    metoprolol succinate (TOPROL-XL) 50 MG 24 hr tablet Take 1 tablet (50 mg total) by mouth once daily. 90 tablet 3    NIFEdipine  (PROCARDIA-XL) 30 MG (OSM) 24 hr tablet Take 1 tablet (30 mg total) by mouth once daily. 30 tablet 11    ONETOUCH ULTRA TEST Strp CHECK BLOOD SUGAR THREE TIMES DAILY (Patient not taking: Reported on 3/18/2025)      pantoprazole (PROTONIX) 40 MG tablet Take 1 tablet (40 mg total) by mouth once daily. 90 tablet 3    potassium chloride (KLOR-CON) 10 MEQ TbSR Take 10 mEq by mouth once daily. (Patient not taking: Reported on 3/18/2025)      tamsulosin (FLOMAX) 0.4 mg Cap Take 1 capsule (0.4 mg total) by mouth once daily. 30 capsule 11    venlafaxine (EFFEXOR-XR) 75 MG 24 hr capsule Take 1 capsule (75 mg total) by mouth once daily. 30 capsule 11    VITAMIN D3 25 mcg (1,000 unit) capsule Take 1,000 Units by mouth once daily.       No current facility-administered medications for this visit.   [2]   Social History  Socioeconomic History    Marital status:    Tobacco Use    Smoking status: Every Day     Current packs/day: 2.00     Average packs/day: 2.0 packs/day for 50.3 years (100.5 ttl pk-yrs)     Types: Cigarettes     Start date: 1975    Smokeless tobacco: Never   Substance and Sexual Activity    Alcohol use: Not Currently    Drug use: Never    Sexual activity: Not Currently     Social Drivers of Health     Financial Resource Strain: High Risk (8/4/2024)    Overall Financial Resource Strain (CARDIA)     Difficulty of Paying Living Expenses: Hard   Food Insecurity: Food Insecurity Present (8/4/2024)    Hunger Vital Sign     Worried About Running Out of Food in the Last Year: Sometimes true     Ran Out of Food in the Last Year: Sometimes true   Transportation Needs: No Transportation Needs (8/5/2024)    TRANSPORTATION NEEDS     Transportation : No   Physical Activity: Insufficiently Active (8/4/2024)    Exercise Vital Sign     Days of Exercise per Week: 2 days     Minutes of Exercise per Session: 20 min   Stress: Stress Concern Present (8/4/2024)    [employer] - Occupational Stress  Questionnaire     Feeling of Stress : Very much   Housing Stability: High Risk (8/4/2024)    Housing Stability Vital Sign     Unable to Pay for Housing in the Last Year: Yes

## 2025-05-19 DIAGNOSIS — I10 ESSENTIAL HYPERTENSION: Primary | ICD-10-CM

## 2025-05-21 RX ORDER — FUROSEMIDE 40 MG/1
20 TABLET ORAL DAILY PRN
Qty: 30 TABLET | Refills: 1 | Status: CANCELLED | OUTPATIENT
Start: 2025-05-21 | End: 2025-07-20

## 2025-05-29 ENCOUNTER — TELEPHONE (OUTPATIENT)
Dept: INTERNAL MEDICINE | Facility: CLINIC | Age: 65
End: 2025-05-29
Payer: MEDICAID

## 2025-06-05 RX ORDER — FUROSEMIDE 40 MG/1
20 TABLET ORAL DAILY PRN
Qty: 30 TABLET | Refills: 1 | Status: SHIPPED | OUTPATIENT
Start: 2025-06-05 | End: 2025-10-03

## 2025-07-15 DIAGNOSIS — J32.9 RHINOSINUSITIS: ICD-10-CM

## 2025-07-15 RX ORDER — FLUTICASONE PROPIONATE 50 MCG
1 SPRAY, SUSPENSION (ML) NASAL DAILY
Qty: 16 G | Refills: 2 | Status: SHIPPED | OUTPATIENT
Start: 2025-07-15

## 2025-08-11 RX ORDER — ATORVASTATIN CALCIUM 40 MG/1
40 TABLET, FILM COATED ORAL DAILY
Qty: 90 TABLET | Refills: 3 | Status: ON HOLD | OUTPATIENT
Start: 2025-08-11 | End: 2026-08-11

## 2025-08-11 RX ORDER — LOSARTAN POTASSIUM 100 MG/1
100 TABLET ORAL DAILY
Qty: 90 TABLET | Refills: 3 | Status: ON HOLD | OUTPATIENT
Start: 2025-08-11 | End: 2026-08-11

## 2025-08-13 ENCOUNTER — HOSPITAL ENCOUNTER (INPATIENT)
Facility: HOSPITAL | Age: 65
LOS: 6 days | Discharge: NURSING, REHAB OR LONG-TERM CARE | DRG: 493 | End: 2025-08-20
Attending: EMERGENCY MEDICINE | Admitting: STUDENT IN AN ORGANIZED HEALTH CARE EDUCATION/TRAINING PROGRAM
Payer: COMMERCIAL

## 2025-08-13 DIAGNOSIS — S09.90XA CLOSED HEAD INJURY, INITIAL ENCOUNTER: Primary | ICD-10-CM

## 2025-08-13 DIAGNOSIS — I10 BENIGN ESSENTIAL HTN: ICD-10-CM

## 2025-08-13 DIAGNOSIS — M79.604 RIGHT LEG PAIN: ICD-10-CM

## 2025-08-13 DIAGNOSIS — S82.61XA CLOSED FRACTURE OF DISTAL LATERAL MALLEOLUS OF RIGHT FIBULA, INITIAL ENCOUNTER: ICD-10-CM

## 2025-08-13 DIAGNOSIS — I73.9 PAD (PERIPHERAL ARTERY DISEASE): ICD-10-CM

## 2025-08-13 DIAGNOSIS — E11.8 CONTROLLED TYPE 2 DIABETES MELLITUS WITH COMPLICATION, WITHOUT LONG-TERM CURRENT USE OF INSULIN: Chronic | ICD-10-CM

## 2025-08-13 DIAGNOSIS — M79.651 RIGHT THIGH PAIN: ICD-10-CM
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
## 2025-08-13 DIAGNOSIS — R07.9 CHEST PAIN: ICD-10-CM

## 2025-08-13 DIAGNOSIS — T14.90XA TRAUMA: ICD-10-CM

## 2025-08-13 DIAGNOSIS — R51.9 ACUTE NONINTRACTABLE HEADACHE, UNSPECIFIED HEADACHE TYPE: ICD-10-CM
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
## 2025-08-13 LAB
ACCEPTIBLE SP GR UR QL: 1.01 (ref 1–1.03)
ALBUMIN SERPL-MCNC: 3.3 G/DL (ref 3.4–4.8)
ALBUMIN/GLOB SERPL: 0.8 RATIO (ref 1.1–2)
ALP SERPL-CCNC: 81 UNIT/L (ref 40–150)
ALT SERPL-CCNC: 9 UNIT/L (ref 0–55)
AMPHET UR QL SCN: NEGATIVE
ANION GAP SERPL CALC-SCNC: 12 MEQ/L
APTT PPP: 26.3 SECONDS (ref 23.2–33.7)
AST SERPL-CCNC: 12 UNIT/L (ref 11–45)
BACTERIA #/AREA URNS AUTO: NORMAL /HPF
BARBITURATE SCN PRESENT UR: NEGATIVE
BASOPHILS # BLD AUTO: 0.06 X10(3)/MCL
BASOPHILS NFR BLD AUTO: 0.6 %
BENZODIAZ UR QL SCN: NEGATIVE
BILIRUB SERPL-MCNC: 0.3 MG/DL
BILIRUB UR QL STRIP.AUTO: NEGATIVE
BUN SERPL-MCNC: 10 MG/DL (ref 8.4–25.7)
CALCIUM SERPL-MCNC: 9 MG/DL (ref 8.8–10)
CANNABINOIDS UR QL SCN: NEGATIVE
CHLORIDE SERPL-SCNC: 104 MMOL/L (ref 98–107)
CK SERPL-CCNC: 290 U/L (ref 30–200)
CLARITY UR: CLEAR
CO2 SERPL-SCNC: 24 MMOL/L (ref 23–31)
COCAINE UR QL SCN: NEGATIVE
COLOR UR AUTO: COLORLESS
CREAT SERPL-MCNC: 1.25 MG/DL (ref 0.72–1.25)
CREAT/UREA NIT SERPL: 8
EOSINOPHIL # BLD AUTO: 0.14 X10(3)/MCL (ref 0–0.9)
EOSINOPHIL NFR BLD AUTO: 1.3 %
ERYTHROCYTE [DISTWIDTH] IN BLOOD BY AUTOMATED COUNT: 16 % (ref 11.5–17)
ETHANOL SERPL-MCNC: <10 MG/DL
FENTANYL UR QL SCN: NEGATIVE
FLUAV AG UPPER RESP QL IA.RAPID: NOT DETECTED
FLUBV AG UPPER RESP QL IA.RAPID: NOT DETECTED
GFR SERPLBLD CREATININE-BSD FMLA CKD-EPI: >60 ML/MIN/1.73/M2
GLOBULIN SER-MCNC: 4.1 GM/DL (ref 2.4–3.5)
GLUCOSE SERPL-MCNC: 110 MG/DL (ref 82–115)
GLUCOSE UR QL STRIP: NORMAL
GROUP & RH: NORMAL
HCT VFR BLD AUTO: 40.9 % (ref 42–52)
HGB BLD-MCNC: 13 G/DL (ref 14–18)
HGB UR QL STRIP: NEGATIVE
IMM GRANULOCYTES # BLD AUTO: 0.02 X10(3)/MCL (ref 0–0.04)
IMM GRANULOCYTES NFR BLD AUTO: 0.2 %
INDIRECT COOMBS: NORMAL
INR PPP: 0.9
KETONES UR QL STRIP: NEGATIVE
LEUKOCYTE ESTERASE UR QL STRIP: NEGATIVE
LYMPHOCYTES # BLD AUTO: 1.78 X10(3)/MCL (ref 0.6–4.6)
LYMPHOCYTES NFR BLD AUTO: 16.4 %
MCH RBC QN AUTO: 29 PG (ref 27–31)
MCHC RBC AUTO-ENTMCNC: 31.8 G/DL (ref 33–36)
MCV RBC AUTO: 91.1 FL (ref 80–94)
MDMA UR QL SCN: NEGATIVE
MONOCYTES # BLD AUTO: 1.22 X10(3)/MCL (ref 0.1–1.3)
MONOCYTES NFR BLD AUTO: 11.2 %
NEUTROPHILS # BLD AUTO: 7.64 X10(3)/MCL (ref 2.1–9.2)
NEUTROPHILS NFR BLD AUTO: 70.3 %
NITRITE UR QL STRIP: NEGATIVE
NRBC BLD AUTO-RTO: 0 %
OHS QRS DURATION: 94 MS
OHS QTC CALCULATION: 411 MS
OPIATES UR QL SCN: NEGATIVE
PCP UR QL: NEGATIVE
PH UR STRIP: 6.5 [PH]
PH UR: 6.5 [PH] (ref 3–11)
PLATELET # BLD AUTO: 278 X10(3)/MCL (ref 130–400)
PMV BLD AUTO: 9.5 FL (ref 7.4–10.4)
POTASSIUM SERPL-SCNC: 3.8 MMOL/L (ref 3.5–5.1)
PROT SERPL-MCNC: 7.4 GM/DL (ref 5.8–7.6)
PROT UR QL STRIP: NEGATIVE
PROTHROMBIN TIME: 12.7 SECONDS (ref 12.5–14.5)
RBC # BLD AUTO: 4.49 X10(6)/MCL (ref 4.7–6.1)
RBC #/AREA URNS AUTO: NORMAL /HPF
RSV A 5' UTR RNA NPH QL NAA+PROBE: NOT DETECTED
SARS-COV-2 RNA RESP QL NAA+PROBE: NOT DETECTED
SODIUM SERPL-SCNC: 140 MMOL/L (ref 136–145)
SP GR UR STRIP.AUTO: 1.01 (ref 1–1.03)
SPECIMEN OUTDATE: NORMAL
SQUAMOUS #/AREA URNS LPF: NORMAL /HPF
STREP A PCR (OHS): NOT DETECTED
TROPONIN I SERPL HS-MCNC: 12 NG/L
UROBILINOGEN UR STRIP-ACNC: NORMAL
WBC # BLD AUTO: 10.86 X10(3)/MCL (ref 4.5–11.5)
WBC #/AREA URNS AUTO: NORMAL /HPF

## 2025-08-13 PROCEDURE — 99283 EMERGENCY DEPT VISIT LOW MDM: CPT | Mod: ,,,

## 2025-08-13 PROCEDURE — G0378 HOSPITAL OBSERVATION PER HR: HCPCS

## 2025-08-13 PROCEDURE — 85610 PROTHROMBIN TIME: CPT | Performed by: EMERGENCY MEDICINE

## 2025-08-13 PROCEDURE — 25000003 PHARM REV CODE 250

## 2025-08-13 PROCEDURE — 93010 ELECTROCARDIOGRAM REPORT: CPT | Mod: ,,, | Performed by: INTERNAL MEDICINE

## 2025-08-13 PROCEDURE — 82077 ASSAY SPEC XCP UR&BREATH IA: CPT | Performed by: EMERGENCY MEDICINE

## 2025-08-13 PROCEDURE — 96372 THER/PROPH/DIAG INJ SC/IM: CPT

## 2025-08-13 PROCEDURE — 80053 COMPREHEN METABOLIC PANEL: CPT | Performed by: EMERGENCY MEDICINE

## 2025-08-13 PROCEDURE — 84484 ASSAY OF TROPONIN QUANT: CPT | Performed by: EMERGENCY MEDICINE

## 2025-08-13 PROCEDURE — 81001 URINALYSIS AUTO W/SCOPE: CPT | Performed by: EMERGENCY MEDICINE

## 2025-08-13 PROCEDURE — 85025 COMPLETE CBC W/AUTO DIFF WBC: CPT | Performed by: EMERGENCY MEDICINE

## 2025-08-13 PROCEDURE — 85730 THROMBOPLASTIN TIME PARTIAL: CPT | Performed by: EMERGENCY MEDICINE

## 2025-08-13 PROCEDURE — 80307 DRUG TEST PRSMV CHEM ANLYZR: CPT | Performed by: EMERGENCY MEDICINE

## 2025-08-13 PROCEDURE — 87651 STREP A DNA AMP PROBE: CPT | Performed by: EMERGENCY MEDICINE

## 2025-08-13 PROCEDURE — 63600175 PHARM REV CODE 636 W HCPCS

## 2025-08-13 PROCEDURE — G0390 TRAUMA RESPONS W/HOSP CRITI: HCPCS

## 2025-08-13 PROCEDURE — 82550 ASSAY OF CK (CPK): CPT | Performed by: EMERGENCY MEDICINE

## 2025-08-13 PROCEDURE — 86901 BLOOD TYPING SEROLOGIC RH(D): CPT | Performed by: EMERGENCY MEDICINE

## 2025-08-13 PROCEDURE — 63600175 PHARM REV CODE 636 W HCPCS: Performed by: EMERGENCY MEDICINE

## 2025-08-13 PROCEDURE — 63600175 PHARM REV CODE 636 W HCPCS: Performed by: NURSE PRACTITIONER

## 2025-08-13 PROCEDURE — 87637 SARSCOV2&INF A&B&RSV AMP PRB: CPT | Performed by: EMERGENCY MEDICINE

## 2025-08-13 RX ORDER — PANTOPRAZOLE SODIUM 40 MG/1
40 TABLET, DELAYED RELEASE ORAL DAILY
Status: DISCONTINUED | OUTPATIENT
Start: 2025-08-13 | End: 2025-08-20 | Stop reason: HOSPADM

## 2025-08-13 RX ORDER — METHOCARBAMOL 100 MG/ML
1000 INJECTION, SOLUTION INTRAMUSCULAR; INTRAVENOUS
Status: COMPLETED | OUTPATIENT
Start: 2025-08-13 | End: 2025-08-13

## 2025-08-13 RX ORDER — GLUCAGON 1 MG
1 KIT INJECTION
Status: DISCONTINUED | OUTPATIENT
Start: 2025-08-13 | End: 2025-08-20 | Stop reason: HOSPADM

## 2025-08-13 RX ORDER — LEVETIRACETAM 500 MG/1
500 TABLET ORAL 2 TIMES DAILY
Status: DISCONTINUED | OUTPATIENT
Start: 2025-08-13 | End: 2025-08-20 | Stop reason: HOSPADM

## 2025-08-13 RX ORDER — NIFEDIPINE 30 MG/1
30 TABLET, EXTENDED RELEASE ORAL DAILY
Status: DISCONTINUED | OUTPATIENT
Start: 2025-08-13 | End: 2025-08-20 | Stop reason: HOSPADM

## 2025-08-13 RX ORDER — IBUPROFEN 200 MG
24 TABLET ORAL
Status: DISCONTINUED | OUTPATIENT
Start: 2025-08-13 | End: 2025-08-20 | Stop reason: HOSPADM

## 2025-08-13 RX ORDER — TAMSULOSIN HYDROCHLORIDE 0.4 MG/1
0.4 CAPSULE ORAL DAILY
Status: DISCONTINUED | OUTPATIENT
Start: 2025-08-13 | End: 2025-08-20 | Stop reason: HOSPADM

## 2025-08-13 RX ORDER — ACETAMINOPHEN 10 MG/ML
1000 INJECTION, SOLUTION INTRAVENOUS ONCE
Status: COMPLETED | OUTPATIENT
Start: 2025-08-13 | End: 2025-08-13

## 2025-08-13 RX ORDER — SODIUM CHLORIDE 0.9 % (FLUSH) 0.9 %
10 SYRINGE (ML) INJECTION EVERY 12 HOURS PRN
Status: DISCONTINUED | OUTPATIENT
Start: 2025-08-13 | End: 2025-08-20 | Stop reason: HOSPADM

## 2025-08-13 RX ORDER — ONDANSETRON HYDROCHLORIDE 2 MG/ML
4 INJECTION, SOLUTION INTRAVENOUS EVERY 6 HOURS PRN
Status: DISCONTINUED | OUTPATIENT
Start: 2025-08-13 | End: 2025-08-20 | Stop reason: HOSPADM

## 2025-08-13 RX ORDER — ACETAMINOPHEN 325 MG/1
650 TABLET ORAL EVERY 6 HOURS PRN
Status: DISCONTINUED | OUTPATIENT
Start: 2025-08-13 | End: 2025-08-18

## 2025-08-13 RX ORDER — LOSARTAN POTASSIUM 50 MG/1
100 TABLET ORAL DAILY
Status: DISCONTINUED | OUTPATIENT
Start: 2025-08-13 | End: 2025-08-20 | Stop reason: HOSPADM

## 2025-08-13 RX ORDER — HYDRALAZINE HYDROCHLORIDE 20 MG/ML
10 INJECTION INTRAMUSCULAR; INTRAVENOUS EVERY 4 HOURS PRN
Status: DISCONTINUED | OUTPATIENT
Start: 2025-08-13 | End: 2025-08-20 | Stop reason: HOSPADM

## 2025-08-13 RX ORDER — ENOXAPARIN SODIUM 100 MG/ML
40 INJECTION SUBCUTANEOUS EVERY 24 HOURS
Status: DISCONTINUED | OUTPATIENT
Start: 2025-08-13 | End: 2025-08-15

## 2025-08-13 RX ORDER — CLOPIDOGREL BISULFATE 75 MG/1
75 TABLET ORAL DAILY
Status: DISCONTINUED | OUTPATIENT
Start: 2025-08-14 | End: 2025-08-20 | Stop reason: HOSPADM

## 2025-08-13 RX ORDER — KETOROLAC TROMETHAMINE 30 MG/ML
15 INJECTION, SOLUTION INTRAMUSCULAR; INTRAVENOUS
Status: COMPLETED | OUTPATIENT
Start: 2025-08-13 | End: 2025-08-13

## 2025-08-13 RX ORDER — VENLAFAXINE HYDROCHLORIDE 37.5 MG/1
75 CAPSULE, EXTENDED RELEASE ORAL DAILY
Status: DISCONTINUED | OUTPATIENT
Start: 2025-08-13 | End: 2025-08-20 | Stop reason: HOSPADM

## 2025-08-13 RX ORDER — HYDRALAZINE HYDROCHLORIDE 20 MG/ML
10 INJECTION INTRAMUSCULAR; INTRAVENOUS
Status: COMPLETED | OUTPATIENT
Start: 2025-08-13 | End: 2025-08-13

## 2025-08-13 RX ORDER — METOPROLOL SUCCINATE 50 MG/1
50 TABLET, EXTENDED RELEASE ORAL DAILY
Status: DISCONTINUED | OUTPATIENT
Start: 2025-08-13 | End: 2025-08-20 | Stop reason: HOSPADM

## 2025-08-13 RX ORDER — MORPHINE SULFATE 4 MG/ML
2 INJECTION, SOLUTION INTRAMUSCULAR; INTRAVENOUS EVERY 6 HOURS PRN
Status: DISCONTINUED | OUTPATIENT
Start: 2025-08-13 | End: 2025-08-14

## 2025-08-13 RX ORDER — NALOXONE HCL 0.4 MG/ML
0.02 VIAL (ML) INJECTION
Status: DISCONTINUED | OUTPATIENT
Start: 2025-08-13 | End: 2025-08-20 | Stop reason: HOSPADM

## 2025-08-13 RX ORDER — OXYCODONE HYDROCHLORIDE 5 MG/1
5 TABLET ORAL EVERY 6 HOURS PRN
Refills: 0 | Status: DISCONTINUED | OUTPATIENT
Start: 2025-08-13 | End: 2025-08-13

## 2025-08-13 RX ORDER — OXYCODONE HYDROCHLORIDE 10 MG/1
10 TABLET ORAL EVERY 6 HOURS PRN
Refills: 0 | Status: DISCONTINUED | OUTPATIENT
Start: 2025-08-13 | End: 2025-08-14

## 2025-08-13 RX ORDER — IBUPROFEN 200 MG
16 TABLET ORAL
Status: DISCONTINUED | OUTPATIENT
Start: 2025-08-13 | End: 2025-08-20 | Stop reason: HOSPADM

## 2025-08-13 RX ORDER — KETOROLAC TROMETHAMINE 30 MG/ML
15 INJECTION, SOLUTION INTRAMUSCULAR; INTRAVENOUS
Status: DISCONTINUED | OUTPATIENT
Start: 2025-08-13 | End: 2025-08-13

## 2025-08-13 RX ORDER — HYDRALAZINE HYDROCHLORIDE 20 MG/ML
10 INJECTION INTRAMUSCULAR; INTRAVENOUS
Status: DISCONTINUED | OUTPATIENT
Start: 2025-08-13 | End: 2025-08-13

## 2025-08-13 RX ORDER — ATORVASTATIN CALCIUM 40 MG/1
40 TABLET, FILM COATED ORAL NIGHTLY
Status: DISCONTINUED | OUTPATIENT
Start: 2025-08-13 | End: 2025-08-20 | Stop reason: HOSPADM

## 2025-08-13 RX ADMIN — OXYCODONE 5 MG: 5 TABLET ORAL at 10:08

## 2025-08-13 RX ADMIN — LOSARTAN POTASSIUM 100 MG: 50 TABLET, FILM COATED ORAL at 09:08

## 2025-08-13 RX ADMIN — KETOROLAC TROMETHAMINE 15 MG: 30 INJECTION, SOLUTION INTRAMUSCULAR at 03:08

## 2025-08-13 RX ADMIN — PANTOPRAZOLE SODIUM 40 MG: 40 TABLET, DELAYED RELEASE ORAL at 09:08

## 2025-08-13 RX ADMIN — HYDRALAZINE HYDROCHLORIDE 10 MG: 20 INJECTION INTRAMUSCULAR; INTRAVENOUS at 10:08

## 2025-08-13 RX ADMIN — VENLAFAXINE HYDROCHLORIDE 75 MG: 37.5 CAPSULE, EXTENDED RELEASE ORAL at 09:08

## 2025-08-13 RX ADMIN — ACETAMINOPHEN 1000 MG: 10 INJECTION, SOLUTION INTRAVENOUS at 02:08

## 2025-08-13 RX ADMIN — LEVETIRACETAM 500 MG: 500 TABLET, FILM COATED ORAL at 09:08

## 2025-08-13 RX ADMIN — HYDRALAZINE HYDROCHLORIDE 10 MG: 20 INJECTION INTRAMUSCULAR; INTRAVENOUS at 03:08

## 2025-08-13 RX ADMIN — TAMSULOSIN HYDROCHLORIDE 0.4 MG: 0.4 CAPSULE ORAL at 09:08

## 2025-08-13 RX ADMIN — OXYCODONE HYDROCHLORIDE 10 MG: 10 TABLET ORAL at 09:08

## 2025-08-13 RX ADMIN — MORPHINE SULFATE 2 MG: 4 INJECTION, SOLUTION INTRAMUSCULAR; INTRAVENOUS at 06:08

## 2025-08-13 RX ADMIN — ENOXAPARIN SODIUM 40 MG: 40 INJECTION SUBCUTANEOUS at 06:08

## 2025-08-13 RX ADMIN — NIFEDIPINE 30 MG: 30 TABLET, FILM COATED, EXTENDED RELEASE ORAL at 09:08

## 2025-08-13 RX ADMIN — METHOCARBAMOL 1000 MG: 100 INJECTION INTRAMUSCULAR; INTRAVENOUS at 02:08

## 2025-08-13 RX ADMIN — ATORVASTATIN CALCIUM 40 MG: 40 TABLET, FILM COATED ORAL at 09:08

## 2025-08-13 RX ADMIN — METOPROLOL SUCCINATE 50 MG: 50 TABLET, EXTENDED RELEASE ORAL at 09:08

## 2025-08-14 ENCOUNTER — ANESTHESIA EVENT (OUTPATIENT)
Dept: SURGERY | Facility: HOSPITAL | Age: 65
DRG: 493 | End: 2025-08-14
Payer: COMMERCIAL

## 2025-08-14 LAB
APICAL FOUR CHAMBER EJECTION FRACTION: 60 %
APICAL TWO CHAMBER EJECTION FRACTION: 49 %
AV INDEX (PROSTH): 0.95
AV MEAN GRADIENT: 3 MMHG
AV PEAK GRADIENT: 6 MMHG
AV VALVE AREA BY VELOCITY RATIO: 4.1 CM²
AV VALVE AREA: 4.3 CM²
AV VELOCITY RATIO: 0.92
BSA FOR ECHO PROCEDURE: 2.43 M2
CV ECHO LV RWT: 0.46 CM
DOP CALC AO PEAK VEL: 1.2 M/S
DOP CALC AO VTI: 19.9 CM
DOP CALC LVOT AREA: 4.5 CM2
DOP CALC LVOT DIAMETER: 2.4 CM
DOP CALC LVOT PEAK VEL: 1.1 M/S
DOP CALC MV VTI: 17.5 CM
DOP CALCLVOT PEAK VEL VTI: 19 CM
E WAVE DECELERATION TIME: 246 MSEC
E/A RATIO: 0.72
E/E' RATIO: 10 M/S
ECHO LV POSTERIOR WALL: 1.1 CM (ref 0.6–1.1)
FRACTIONAL SHORTENING: 29.2 % (ref 28–44)
HR MV ECHO: 79 BPM
INTERVENTRICULAR SEPTUM: 1.2 CM (ref 0.6–1.1)
LEFT ATRIUM AREA SYSTOLIC (APICAL 4 CHAMBER): 13.2 CM2
LEFT ATRIUM SIZE: 3.9 CM
LEFT INTERNAL DIMENSION IN SYSTOLE: 3.4 CM (ref 2.1–4)
LEFT VENTRICLE DIASTOLIC VOLUME INDEX: 45.76 ML/M2
LEFT VENTRICLE DIASTOLIC VOLUME: 108 ML
LEFT VENTRICLE END DIASTOLIC VOLUME APICAL 2 CHAMBER: 136 ML
LEFT VENTRICLE END DIASTOLIC VOLUME APICAL 4 CHAMBER INDEX BSA: 61.02 ML/M2
LEFT VENTRICLE END DIASTOLIC VOLUME APICAL 4 CHAMBER: 144 ML
LEFT VENTRICLE END SYSTOLIC VOLUME APICAL 4 CHAMBER: 21.5 ML
LEFT VENTRICLE MASS INDEX: 87.4 G/M2
LEFT VENTRICLE SYSTOLIC VOLUME INDEX: 19.9 ML/M2
LEFT VENTRICLE SYSTOLIC VOLUME: 47 ML
LEFT VENTRICULAR INTERNAL DIMENSION IN DIASTOLE: 4.8 CM (ref 3.5–6)
LEFT VENTRICULAR MASS: 206.4 G
LV LATERAL E/E' RATIO: 8.6 M/S
LV SEPTAL E/E' RATIO: 10.8 M/S
LVED V (TEICH): 108 ML
LVES V (TEICH): 47.4 ML
LVOT MG: 2 MMHG
LVOT MV: 0.69 CM/S
MV MEAN GRADIENT: 2 MMHG
MV PEAK A VEL: 0.6 M/S
MV PEAK E VEL: 0.43 M/S
MV PEAK GRADIENT: 4 MMHG
MV STENOSIS PRESSURE HALF TIME: 43 MS
MV VALVE AREA BY CONTINUITY EQUATION: 4.91 CM2
MV VALVE AREA P 1/2 METHOD: 5.12 CM2
OHS CV CPX PATIENT HEIGHT IN: 71
OHS LV EJECTION FRACTION SIMPSONS BIPLANE MOD: 53 %
PISA TR MAX VEL: 1.7 M/S
PV PEAK GRADIENT: 3 MMHG
PV PEAK VELOCITY: 0.8 M/S
RA PRESSURE ESTIMATED: 3 MMHG
RV TB RVSP: 5 MMHG
TDI LATERAL: 0.05 M/S
TDI SEPTAL: 0.04 M/S
TDI: 0.05 M/S
TR MAX PG: 12 MMHG
TRICUSPID ANNULAR PLANE SYSTOLIC EXCURSION: 2 CM
TV REST PULMONARY ARTERY PRESSURE: 15 MMHG
Z-SCORE OF LEFT VENTRICULAR DIMENSION IN END DIASTOLE: -7.37
Z-SCORE OF LEFT VENTRICULAR DIMENSION IN END SYSTOLE: -4.53

## 2025-08-14 PROCEDURE — 63600175 PHARM REV CODE 636 W HCPCS

## 2025-08-14 PROCEDURE — 97162 PT EVAL MOD COMPLEX 30 MIN: CPT

## 2025-08-14 PROCEDURE — 97167 OT EVAL HIGH COMPLEX 60 MIN: CPT

## 2025-08-14 PROCEDURE — 25000003 PHARM REV CODE 250: Performed by: INTERNAL MEDICINE

## 2025-08-14 PROCEDURE — 99223 1ST HOSP IP/OBS HIGH 75: CPT | Mod: ,,, | Performed by: ORTHOPAEDIC SURGERY

## 2025-08-14 PROCEDURE — 63600175 PHARM REV CODE 636 W HCPCS: Performed by: INTERNAL MEDICINE

## 2025-08-14 PROCEDURE — 21400001 HC TELEMETRY ROOM

## 2025-08-14 PROCEDURE — 25000003 PHARM REV CODE 250

## 2025-08-14 RX ORDER — MORPHINE SULFATE 4 MG/ML
2 INJECTION, SOLUTION INTRAMUSCULAR; INTRAVENOUS EVERY 4 HOURS PRN
Status: DISCONTINUED | OUTPATIENT
Start: 2025-08-14 | End: 2025-08-18

## 2025-08-14 RX ORDER — OXYCODONE HYDROCHLORIDE 10 MG/1
10 TABLET ORAL EVERY 4 HOURS PRN
Refills: 0 | Status: DISCONTINUED | OUTPATIENT
Start: 2025-08-14 | End: 2025-08-18

## 2025-08-14 RX ADMIN — MORPHINE SULFATE 2 MG: 4 INJECTION, SOLUTION INTRAMUSCULAR; INTRAVENOUS at 02:08

## 2025-08-14 RX ADMIN — MORPHINE SULFATE 2 MG: 4 INJECTION, SOLUTION INTRAMUSCULAR; INTRAVENOUS at 01:08

## 2025-08-14 RX ADMIN — PANTOPRAZOLE SODIUM 40 MG: 40 TABLET, DELAYED RELEASE ORAL at 06:08

## 2025-08-14 RX ADMIN — OXYCODONE HYDROCHLORIDE 10 MG: 10 TABLET ORAL at 09:08

## 2025-08-14 RX ADMIN — OXYCODONE HYDROCHLORIDE 10 MG: 10 TABLET ORAL at 08:08

## 2025-08-14 RX ADMIN — ATORVASTATIN CALCIUM 40 MG: 40 TABLET, FILM COATED ORAL at 08:08

## 2025-08-14 RX ADMIN — NIFEDIPINE 30 MG: 30 TABLET, FILM COATED, EXTENDED RELEASE ORAL at 09:08

## 2025-08-14 RX ADMIN — LEVETIRACETAM 500 MG: 500 TABLET, FILM COATED ORAL at 08:08

## 2025-08-14 RX ADMIN — LOSARTAN POTASSIUM 100 MG: 50 TABLET, FILM COATED ORAL at 09:08

## 2025-08-14 RX ADMIN — LEVETIRACETAM 500 MG: 500 TABLET, FILM COATED ORAL at 09:08

## 2025-08-14 RX ADMIN — METOPROLOL SUCCINATE 50 MG: 50 TABLET, EXTENDED RELEASE ORAL at 09:08

## 2025-08-14 RX ADMIN — TAMSULOSIN HYDROCHLORIDE 0.4 MG: 0.4 CAPSULE ORAL at 09:08

## 2025-08-14 RX ADMIN — OXYCODONE HYDROCHLORIDE 10 MG: 10 TABLET ORAL at 05:08

## 2025-08-14 RX ADMIN — ENOXAPARIN SODIUM 40 MG: 40 INJECTION SUBCUTANEOUS at 05:08

## 2025-08-14 RX ADMIN — CLOPIDOGREL BISULFATE 75 MG: 75 TABLET, FILM COATED ORAL at 09:08

## 2025-08-14 RX ADMIN — VENLAFAXINE HYDROCHLORIDE 75 MG: 37.5 CAPSULE, EXTENDED RELEASE ORAL at 09:08

## 2025-08-14 RX ADMIN — OXYCODONE HYDROCHLORIDE 10 MG: 10 TABLET ORAL at 03:08

## 2025-08-14 RX ADMIN — MORPHINE SULFATE 2 MG: 4 INJECTION, SOLUTION INTRAMUSCULAR; INTRAVENOUS at 07:08

## 2025-08-15 ENCOUNTER — ANESTHESIA (OUTPATIENT)
Dept: SURGERY | Facility: HOSPITAL | Age: 65
DRG: 493 | End: 2025-08-15
Payer: COMMERCIAL

## 2025-08-15 LAB
ALBUMIN SERPL-MCNC: 2.8 G/DL (ref 3.4–4.8)
ALBUMIN/GLOB SERPL: 0.7 RATIO (ref 1.1–2)
ALP SERPL-CCNC: 72 UNIT/L (ref 40–150)
ALT SERPL-CCNC: 7 UNIT/L (ref 0–55)
ANION GAP SERPL CALC-SCNC: 9 MEQ/L
AST SERPL-CCNC: 11 UNIT/L (ref 11–45)
BASOPHILS # BLD AUTO: 0.02 X10(3)/MCL
BASOPHILS NFR BLD AUTO: 0.2 %
BILIRUB SERPL-MCNC: 0.5 MG/DL
BUN SERPL-MCNC: 17.5 MG/DL (ref 8.4–25.7)
CALCIUM SERPL-MCNC: 8.6 MG/DL (ref 8.8–10)
CHLORIDE SERPL-SCNC: 98 MMOL/L (ref 98–107)
CO2 SERPL-SCNC: 27 MMOL/L (ref 23–31)
CREAT SERPL-MCNC: 1.08 MG/DL (ref 0.72–1.25)
CREAT/UREA NIT SERPL: 16
EOSINOPHIL # BLD AUTO: 0.19 X10(3)/MCL (ref 0–0.9)
EOSINOPHIL NFR BLD AUTO: 2.2 %
ERYTHROCYTE [DISTWIDTH] IN BLOOD BY AUTOMATED COUNT: 15.5 % (ref 11.5–17)
EST. AVERAGE GLUCOSE BLD GHB EST-MCNC: 122.6 MG/DL
GFR SERPLBLD CREATININE-BSD FMLA CKD-EPI: >60 ML/MIN/1.73/M2
GLOBULIN SER-MCNC: 4.2 GM/DL (ref 2.4–3.5)
GLUCOSE SERPL-MCNC: 112 MG/DL (ref 82–115)
HBA1C MFR BLD: 5.9 %
HCT VFR BLD AUTO: 39.2 % (ref 42–52)
HGB BLD-MCNC: 12.7 G/DL (ref 14–18)
IMM GRANULOCYTES # BLD AUTO: 0.02 X10(3)/MCL (ref 0–0.04)
IMM GRANULOCYTES NFR BLD AUTO: 0.2 %
LEFT CFA PSV: 81 CM/S
LEFT DORSALIS PEDIS PSV: 74 CM/S
LEFT POST TIBIAL SYS PSV: 38 CM/S
LEFT PROFUNDA SYS PSV: 75 CM/S
LEFT SUPER FEMORAL DIST SYS PSV: 113 CM/S
LEFT SUPER FEMORAL MID SYS PSV: 84 CM/S
LEFT SUPER FEMORAL PROX SYS PSV: 97 CM/S
LEFT TIB/PER TRUNK SYS PSV: 82 CM/S
LYMPHOCYTES # BLD AUTO: 1.43 X10(3)/MCL (ref 0.6–4.6)
LYMPHOCYTES NFR BLD AUTO: 16.8 %
MAGNESIUM SERPL-MCNC: 2.1 MG/DL (ref 1.6–2.6)
MCH RBC QN AUTO: 29.1 PG (ref 27–31)
MCHC RBC AUTO-ENTMCNC: 32.4 G/DL (ref 33–36)
MCV RBC AUTO: 89.7 FL (ref 80–94)
MONOCYTES # BLD AUTO: 1.02 X10(3)/MCL (ref 0.1–1.3)
MONOCYTES NFR BLD AUTO: 12 %
NEUTROPHILS # BLD AUTO: 5.84 X10(3)/MCL (ref 2.1–9.2)
NEUTROPHILS NFR BLD AUTO: 68.6 %
NRBC BLD AUTO-RTO: 0 %
OHS CV CPX PATIENT HEIGHT IN: 71
OHS CV CPX PATIENT HEIGHT IN: 71
PLATELET # BLD AUTO: 266 X10(3)/MCL (ref 130–400)
PMV BLD AUTO: 9.6 FL (ref 7.4–10.4)
POCT GLUCOSE: 161 MG/DL (ref 70–110)
POTASSIUM SERPL-SCNC: 4 MMOL/L (ref 3.5–5.1)
PROT SERPL-MCNC: 7 GM/DL (ref 5.8–7.6)
RBC # BLD AUTO: 4.37 X10(6)/MCL (ref 4.7–6.1)
RIGHT CFA PSV: 111 CM/S
RIGHT DORSALIS PEDIS PSV: 58 CM/S
RIGHT POPLITEAL PSV: 119 CM/S
RIGHT POST TIBIAL SYS PSV: 42 CM/S
RIGHT PROFUNDA SYS PSV: 86 CM/S
RIGHT SUPER FEMORAL DIST SYS PSV: 130 CM/S
RIGHT SUPER FEMORAL MID SYS PSV: 134 CM/S
RIGHT SUPER FEMORAL PROX SYS PSV: 123 CM/S
SODIUM SERPL-SCNC: 134 MMOL/L (ref 136–145)
WBC # BLD AUTO: 8.52 X10(3)/MCL (ref 4.5–11.5)

## 2025-08-15 PROCEDURE — 27829 TREAT LOWER LEG JOINT: CPT | Mod: 51,RT,, | Performed by: ORTHOPAEDIC SURGERY

## 2025-08-15 PROCEDURE — 36000711: Performed by: ORTHOPAEDIC SURGERY

## 2025-08-15 PROCEDURE — 83036 HEMOGLOBIN GLYCOSYLATED A1C: CPT | Performed by: ORTHOPAEDIC SURGERY

## 2025-08-15 PROCEDURE — 27201423 OPTIME MED/SURG SUP & DEVICES STERILE SUPPLY: Performed by: ORTHOPAEDIC SURGERY

## 2025-08-15 PROCEDURE — 85025 COMPLETE CBC W/AUTO DIFF WBC: CPT | Performed by: INTERNAL MEDICINE

## 2025-08-15 PROCEDURE — 27100171 HC OXYGEN HIGH FLOW UP TO 24 HOURS

## 2025-08-15 PROCEDURE — 27000190 HC CPAP FULL FACE MASK W/VALVE

## 2025-08-15 PROCEDURE — 71000033 HC RECOVERY, INTIAL HOUR: Performed by: ORTHOPAEDIC SURGERY

## 2025-08-15 PROCEDURE — 71000039 HC RECOVERY, EACH ADD'L HOUR: Performed by: ORTHOPAEDIC SURGERY

## 2025-08-15 PROCEDURE — 36000710: Performed by: ORTHOPAEDIC SURGERY

## 2025-08-15 PROCEDURE — 97116 GAIT TRAINING THERAPY: CPT

## 2025-08-15 PROCEDURE — 99900031 HC PATIENT EDUCATION (STAT)

## 2025-08-15 PROCEDURE — 37000009 HC ANESTHESIA EA ADD 15 MINS: Performed by: ORTHOPAEDIC SURGERY

## 2025-08-15 PROCEDURE — 94799 UNLISTED PULMONARY SVC/PX: CPT

## 2025-08-15 PROCEDURE — 97535 SELF CARE MNGMENT TRAINING: CPT

## 2025-08-15 PROCEDURE — 97530 THERAPEUTIC ACTIVITIES: CPT

## 2025-08-15 PROCEDURE — 21400001 HC TELEMETRY ROOM

## 2025-08-15 PROCEDURE — 63600175 PHARM REV CODE 636 W HCPCS: Performed by: NURSE ANESTHETIST, CERTIFIED REGISTERED

## 2025-08-15 PROCEDURE — 63600175 PHARM REV CODE 636 W HCPCS: Performed by: PHYSICIAN ASSISTANT

## 2025-08-15 PROCEDURE — 25000242 PHARM REV CODE 250 ALT 637 W/ HCPCS: Performed by: NURSE ANESTHETIST, CERTIFIED REGISTERED

## 2025-08-15 PROCEDURE — 94660 CPAP INITIATION&MGMT: CPT

## 2025-08-15 PROCEDURE — 27814 TREATMENT OF ANKLE FRACTURE: CPT | Mod: RT,,, | Performed by: ORTHOPAEDIC SURGERY

## 2025-08-15 PROCEDURE — 37000008 HC ANESTHESIA 1ST 15 MINUTES: Performed by: ORTHOPAEDIC SURGERY

## 2025-08-15 PROCEDURE — 99900035 HC TECH TIME PER 15 MIN (STAT)

## 2025-08-15 PROCEDURE — 25000003 PHARM REV CODE 250: Performed by: INTERNAL MEDICINE

## 2025-08-15 PROCEDURE — 36415 COLL VENOUS BLD VENIPUNCTURE: CPT | Performed by: ORTHOPAEDIC SURGERY

## 2025-08-15 PROCEDURE — 63600175 PHARM REV CODE 636 W HCPCS: Performed by: INTERNAL MEDICINE

## 2025-08-15 PROCEDURE — 80053 COMPREHEN METABOLIC PANEL: CPT | Performed by: INTERNAL MEDICINE

## 2025-08-15 PROCEDURE — 25000003 PHARM REV CODE 250: Performed by: NURSE ANESTHETIST, CERTIFIED REGISTERED

## 2025-08-15 PROCEDURE — 25000003 PHARM REV CODE 250

## 2025-08-15 PROCEDURE — 36415 COLL VENOUS BLD VENIPUNCTURE: CPT | Performed by: INTERNAL MEDICINE

## 2025-08-15 PROCEDURE — 63600175 PHARM REV CODE 636 W HCPCS: Performed by: ORTHOPAEDIC SURGERY

## 2025-08-15 PROCEDURE — 0QSJ04Z REPOSITION RIGHT FIBULA WITH INTERNAL FIXATION DEVICE, OPEN APPROACH: ICD-10-PCS | Performed by: ORTHOPAEDIC SURGERY

## 2025-08-15 PROCEDURE — 83735 ASSAY OF MAGNESIUM: CPT | Performed by: INTERNAL MEDICINE

## 2025-08-15 PROCEDURE — 27814 TREATMENT OF ANKLE FRACTURE: CPT | Mod: AS,RT,, | Performed by: PHYSICIAN ASSISTANT

## 2025-08-15 PROCEDURE — 94760 N-INVAS EAR/PLS OXIMETRY 1: CPT

## 2025-08-15 PROCEDURE — 0QSG04Z REPOSITION RIGHT TIBIA WITH INTERNAL FIXATION DEVICE, OPEN APPROACH: ICD-10-PCS | Performed by: ORTHOPAEDIC SURGERY

## 2025-08-15 PROCEDURE — C1713 ANCHOR/SCREW BN/BN,TIS/BN: HCPCS | Performed by: ORTHOPAEDIC SURGERY

## 2025-08-15 PROCEDURE — 27829 TREAT LOWER LEG JOINT: CPT | Mod: AS,51,RT, | Performed by: PHYSICIAN ASSISTANT

## 2025-08-15 DEVICE — IMPLANTABLE DEVICE: Type: IMPLANTABLE DEVICE | Site: LEG | Status: FUNCTIONAL

## 2025-08-15 DEVICE — KIT KNTLS T-ROPE SYNDSMOS DRVR: Type: IMPLANTABLE DEVICE | Site: LEG | Status: FUNCTIONAL

## 2025-08-15 DEVICE — SCREW LP CORTICAL 2.7X16MM SS: Type: IMPLANTABLE DEVICE | Site: LEG | Status: FUNCTIONAL

## 2025-08-15 RX ORDER — ROCURONIUM BROMIDE 10 MG/ML
INJECTION, SOLUTION INTRAVENOUS
Status: DISCONTINUED | OUTPATIENT
Start: 2025-08-15 | End: 2025-08-15

## 2025-08-15 RX ORDER — ETOMIDATE 2 MG/ML
INJECTION INTRAVENOUS
Status: DISCONTINUED | OUTPATIENT
Start: 2025-08-15 | End: 2025-08-15

## 2025-08-15 RX ORDER — TIZANIDINE 4 MG/1
4 TABLET ORAL EVERY 8 HOURS PRN
Status: DISCONTINUED | OUTPATIENT
Start: 2025-08-15 | End: 2025-08-20 | Stop reason: HOSPADM

## 2025-08-15 RX ORDER — ALBUTEROL SULFATE 90 UG/1
INHALANT RESPIRATORY (INHALATION)
Status: DISCONTINUED | OUTPATIENT
Start: 2025-08-15 | End: 2025-08-15

## 2025-08-15 RX ORDER — DEXAMETHASONE SODIUM PHOSPHATE 4 MG/ML
INJECTION, SOLUTION INTRA-ARTICULAR; INTRALESIONAL; INTRAMUSCULAR; INTRAVENOUS; SOFT TISSUE
Status: DISCONTINUED | OUTPATIENT
Start: 2025-08-15 | End: 2025-08-15

## 2025-08-15 RX ORDER — ACETAMINOPHEN 500 MG
1000 TABLET ORAL
Status: DISCONTINUED | OUTPATIENT
Start: 2025-08-15 | End: 2025-08-20 | Stop reason: HOSPADM

## 2025-08-15 RX ORDER — VANCOMYCIN HYDROCHLORIDE 1 G/20ML
INJECTION, POWDER, LYOPHILIZED, FOR SOLUTION INTRAVENOUS
Status: DISCONTINUED | OUTPATIENT
Start: 2025-08-15 | End: 2025-08-15 | Stop reason: HOSPADM

## 2025-08-15 RX ORDER — LIDOCAINE HYDROCHLORIDE 20 MG/ML
INJECTION, SOLUTION EPIDURAL; INFILTRATION; INTRACAUDAL; PERINEURAL
Status: DISCONTINUED | OUTPATIENT
Start: 2025-08-15 | End: 2025-08-15

## 2025-08-15 RX ORDER — KETOROLAC TROMETHAMINE 30 MG/ML
INJECTION, SOLUTION INTRAMUSCULAR; INTRAVENOUS
Status: DISCONTINUED | OUTPATIENT
Start: 2025-08-15 | End: 2025-08-15

## 2025-08-15 RX ORDER — ONDANSETRON HYDROCHLORIDE 2 MG/ML
4 INJECTION, SOLUTION INTRAVENOUS DAILY PRN
Status: DISCONTINUED | OUTPATIENT
Start: 2025-08-15 | End: 2025-08-15 | Stop reason: HOSPADM

## 2025-08-15 RX ORDER — ENOXAPARIN SODIUM 100 MG/ML
40 INJECTION SUBCUTANEOUS EVERY 24 HOURS
Status: DISCONTINUED | OUTPATIENT
Start: 2025-08-15 | End: 2025-08-20 | Stop reason: HOSPADM

## 2025-08-15 RX ORDER — ACETAMINOPHEN 10 MG/ML
INJECTION, SOLUTION INTRAVENOUS
Status: DISCONTINUED | OUTPATIENT
Start: 2025-08-15 | End: 2025-08-15

## 2025-08-15 RX ORDER — FENTANYL CITRATE 50 UG/ML
INJECTION, SOLUTION INTRAMUSCULAR; INTRAVENOUS
Status: DISCONTINUED | OUTPATIENT
Start: 2025-08-15 | End: 2025-08-15

## 2025-08-15 RX ORDER — OXYCODONE HYDROCHLORIDE 5 MG/1
5 TABLET ORAL EVERY 6 HOURS PRN
Refills: 0 | Status: DISCONTINUED | OUTPATIENT
Start: 2025-08-15 | End: 2025-08-18

## 2025-08-15 RX ORDER — NALOXONE HCL 0.4 MG/ML
VIAL (ML) INJECTION
Status: DISCONTINUED | OUTPATIENT
Start: 2025-08-15 | End: 2025-08-15

## 2025-08-15 RX ORDER — CLINDAMYCIN PHOSPHATE 900 MG/50ML
900 INJECTION, SOLUTION INTRAVENOUS ONCE
Status: DISCONTINUED | OUTPATIENT
Start: 2025-08-15 | End: 2025-08-15 | Stop reason: HOSPADM

## 2025-08-15 RX ORDER — SODIUM CHLORIDE 0.9 % (FLUSH) 0.9 %
10 SYRINGE (ML) INJECTION
Status: DISCONTINUED | OUTPATIENT
Start: 2025-08-15 | End: 2025-08-15 | Stop reason: HOSPADM

## 2025-08-15 RX ORDER — ONDANSETRON HYDROCHLORIDE 2 MG/ML
INJECTION, SOLUTION INTRAVENOUS
Status: DISCONTINUED | OUTPATIENT
Start: 2025-08-15 | End: 2025-08-15

## 2025-08-15 RX ORDER — HYDROMORPHONE HYDROCHLORIDE 2 MG/ML
0.4 INJECTION, SOLUTION INTRAMUSCULAR; INTRAVENOUS; SUBCUTANEOUS EVERY 5 MIN PRN
Status: DISCONTINUED | OUTPATIENT
Start: 2025-08-15 | End: 2025-08-15 | Stop reason: HOSPADM

## 2025-08-15 RX ORDER — CEFAZOLIN 2 G/1
2 INJECTION, POWDER, FOR SOLUTION INTRAMUSCULAR; INTRAVENOUS
Status: COMPLETED | OUTPATIENT
Start: 2025-08-15 | End: 2025-08-16

## 2025-08-15 RX ORDER — CALCIUM CHLORIDE INJECTION 100 MG/ML
INJECTION, SOLUTION INTRAVENOUS
Status: DISCONTINUED | OUTPATIENT
Start: 2025-08-15 | End: 2025-08-15

## 2025-08-15 RX ORDER — PHENYLEPHRINE HCL IN 0.9% NACL 1 MG/10 ML
SYRINGE (ML) INTRAVENOUS
Status: DISCONTINUED | OUTPATIENT
Start: 2025-08-15 | End: 2025-08-15

## 2025-08-15 RX ADMIN — ROCURONIUM BROMIDE 50 MG: 10 SOLUTION INTRAVENOUS at 08:08

## 2025-08-15 RX ADMIN — FENTANYL CITRATE 100 MCG: 50 INJECTION, SOLUTION INTRAMUSCULAR; INTRAVENOUS at 09:08

## 2025-08-15 RX ADMIN — MORPHINE SULFATE 2 MG: 4 INJECTION, SOLUTION INTRAMUSCULAR; INTRAVENOUS at 05:08

## 2025-08-15 RX ADMIN — ONDANSETRON 4 MG: 2 INJECTION INTRAMUSCULAR; INTRAVENOUS at 09:08

## 2025-08-15 RX ADMIN — CALCIUM CHLORIDE INJECTION 1 G: 100 INJECTION, SOLUTION INTRAVENOUS at 09:08

## 2025-08-15 RX ADMIN — LEVETIRACETAM 500 MG: 500 TABLET, FILM COATED ORAL at 08:08

## 2025-08-15 RX ADMIN — TAMSULOSIN HYDROCHLORIDE 0.4 MG: 0.4 CAPSULE ORAL at 12:08

## 2025-08-15 RX ADMIN — KETOROLAC TROMETHAMINE 15 MG: 30 INJECTION, SOLUTION INTRAMUSCULAR; INTRAVENOUS at 09:08

## 2025-08-15 RX ADMIN — ALBUTEROL SULFATE 2 PUFF: 90 AEROSOL, METERED RESPIRATORY (INHALATION) at 09:08

## 2025-08-15 RX ADMIN — VENLAFAXINE HYDROCHLORIDE 75 MG: 37.5 CAPSULE, EXTENDED RELEASE ORAL at 12:08

## 2025-08-15 RX ADMIN — OXYCODONE HYDROCHLORIDE 10 MG: 10 TABLET ORAL at 02:08

## 2025-08-15 RX ADMIN — FENTANYL CITRATE 100 MCG: 50 INJECTION, SOLUTION INTRAMUSCULAR; INTRAVENOUS at 08:08

## 2025-08-15 RX ADMIN — LIDOCAINE HYDROCHLORIDE 4 ML: 20 INJECTION, SOLUTION EPIDURAL; INFILTRATION; INTRACAUDAL; PERINEURAL at 08:08

## 2025-08-15 RX ADMIN — SUGAMMADEX 200 MG: 100 INJECTION, SOLUTION INTRAVENOUS at 09:08

## 2025-08-15 RX ADMIN — LIDOCAINE HYDROCHLORIDE 100 MG: 20 INJECTION, SOLUTION EPIDURAL; INFILTRATION; INTRACAUDAL; PERINEURAL at 09:08

## 2025-08-15 RX ADMIN — CLOPIDOGREL BISULFATE 75 MG: 75 TABLET, FILM COATED ORAL at 12:08

## 2025-08-15 RX ADMIN — NALOXONE HYDROCHLORIDE 0.04 MCG: 0.4 INJECTION, SOLUTION INTRAMUSCULAR; INTRAVENOUS; SUBCUTANEOUS at 10:08

## 2025-08-15 RX ADMIN — CEFAZOLIN 2 G: 2 INJECTION, POWDER, FOR SOLUTION INTRAMUSCULAR; INTRAVENOUS at 11:08

## 2025-08-15 RX ADMIN — DEXAMETHASONE SODIUM PHOSPHATE 4 MG: 4 INJECTION, SOLUTION INTRA-ARTICULAR; INTRALESIONAL; INTRAMUSCULAR; INTRAVENOUS; SOFT TISSUE at 09:08

## 2025-08-15 RX ADMIN — Medication 200 MCG: at 09:08

## 2025-08-15 RX ADMIN — ACETAMINOPHEN 1000 MG: 10 INJECTION, SOLUTION INTRAVENOUS at 09:08

## 2025-08-15 RX ADMIN — SODIUM CHLORIDE, SODIUM GLUCONATE, SODIUM ACETATE, POTASSIUM CHLORIDE AND MAGNESIUM CHLORIDE: 526; 502; 368; 37; 30 INJECTION, SOLUTION INTRAVENOUS at 08:08

## 2025-08-15 RX ADMIN — ATORVASTATIN CALCIUM 40 MG: 40 TABLET, FILM COATED ORAL at 08:08

## 2025-08-15 RX ADMIN — CEFAZOLIN 2 G: 2 INJECTION, POWDER, FOR SOLUTION INTRAMUSCULAR; INTRAVENOUS at 06:08

## 2025-08-15 RX ADMIN — OXYCODONE HYDROCHLORIDE 10 MG: 10 TABLET ORAL at 08:08

## 2025-08-15 RX ADMIN — METOPROLOL SUCCINATE 50 MG: 50 TABLET, EXTENDED RELEASE ORAL at 08:08

## 2025-08-15 RX ADMIN — OXYCODONE HYDROCHLORIDE 10 MG: 10 TABLET ORAL at 12:08

## 2025-08-15 RX ADMIN — ENOXAPARIN SODIUM 40 MG: 40 INJECTION SUBCUTANEOUS at 04:08

## 2025-08-15 RX ADMIN — ETOMIDATE 20 MG: 2 INJECTION INTRAVENOUS at 08:08

## 2025-08-16 LAB
ALBUMIN SERPL-MCNC: 2.8 G/DL (ref 3.4–4.8)
ALBUMIN/GLOB SERPL: 0.6 RATIO (ref 1.1–2)
ALP SERPL-CCNC: 73 UNIT/L (ref 40–150)
ALT SERPL-CCNC: 8 UNIT/L (ref 0–55)
ANION GAP SERPL CALC-SCNC: 7 MEQ/L
AST SERPL-CCNC: 12 UNIT/L (ref 11–45)
BASOPHILS # BLD AUTO: 0.02 X10(3)/MCL
BASOPHILS NFR BLD AUTO: 0.2 %
BILIRUB SERPL-MCNC: 0.3 MG/DL
BUN SERPL-MCNC: 16.3 MG/DL (ref 8.4–25.7)
CALCIUM SERPL-MCNC: 9.1 MG/DL (ref 8.8–10)
CHLORIDE SERPL-SCNC: 101 MMOL/L (ref 98–107)
CO2 SERPL-SCNC: 26 MMOL/L (ref 23–31)
CREAT SERPL-MCNC: 0.98 MG/DL (ref 0.72–1.25)
CREAT/UREA NIT SERPL: 17
EOSINOPHIL # BLD AUTO: 0.01 X10(3)/MCL (ref 0–0.9)
EOSINOPHIL NFR BLD AUTO: 0.1 %
ERYTHROCYTE [DISTWIDTH] IN BLOOD BY AUTOMATED COUNT: 15.3 % (ref 11.5–17)
GFR SERPLBLD CREATININE-BSD FMLA CKD-EPI: >60 ML/MIN/1.73/M2
GLOBULIN SER-MCNC: 4.5 GM/DL (ref 2.4–3.5)
GLUCOSE SERPL-MCNC: 119 MG/DL (ref 82–115)
HCT VFR BLD AUTO: 40.1 % (ref 42–52)
HGB BLD-MCNC: 13 G/DL (ref 14–18)
IMM GRANULOCYTES # BLD AUTO: 0.04 X10(3)/MCL (ref 0–0.04)
IMM GRANULOCYTES NFR BLD AUTO: 0.4 %
LEFT ABI: 1.14
LEFT DORSALIS PEDIS: 177 MMHG
LEFT POSTERIOR TIBIAL: 169 MMHG
LYMPHOCYTES # BLD AUTO: 1.36 X10(3)/MCL (ref 0.6–4.6)
LYMPHOCYTES NFR BLD AUTO: 12.2 %
MAGNESIUM SERPL-MCNC: 2 MG/DL (ref 1.6–2.6)
MCH RBC QN AUTO: 28.8 PG (ref 27–31)
MCHC RBC AUTO-ENTMCNC: 32.4 G/DL (ref 33–36)
MCV RBC AUTO: 88.9 FL (ref 80–94)
MONOCYTES # BLD AUTO: 0.9 X10(3)/MCL (ref 0.1–1.3)
MONOCYTES NFR BLD AUTO: 8.1 %
NEUTROPHILS # BLD AUTO: 8.85 X10(3)/MCL (ref 2.1–9.2)
NEUTROPHILS NFR BLD AUTO: 79 %
NRBC BLD AUTO-RTO: 0 %
OHS CV CPX PATIENT HEIGHT IN: 71
PLATELET # BLD AUTO: 262 X10(3)/MCL (ref 130–400)
PMV BLD AUTO: 10 FL (ref 7.4–10.4)
POTASSIUM SERPL-SCNC: 4.4 MMOL/L (ref 3.5–5.1)
PROT SERPL-MCNC: 7.3 GM/DL (ref 5.8–7.6)
RBC # BLD AUTO: 4.51 X10(6)/MCL (ref 4.7–6.1)
RIGHT ABI: 0.93
RIGHT ARM BP: 155 MMHG
RIGHT DORSALIS PEDIS: 144 MMHG
RIGHT POSTERIOR TIBIAL: 139 MMHG
SODIUM SERPL-SCNC: 134 MMOL/L (ref 136–145)
WBC # BLD AUTO: 11.18 X10(3)/MCL (ref 4.5–11.5)

## 2025-08-16 PROCEDURE — 25000003 PHARM REV CODE 250: Performed by: INTERNAL MEDICINE

## 2025-08-16 PROCEDURE — 27000221 HC OXYGEN, UP TO 24 HOURS

## 2025-08-16 PROCEDURE — 63600175 PHARM REV CODE 636 W HCPCS: Performed by: PHYSICIAN ASSISTANT

## 2025-08-16 PROCEDURE — 25000003 PHARM REV CODE 250

## 2025-08-16 PROCEDURE — 99900031 HC PATIENT EDUCATION (STAT)

## 2025-08-16 PROCEDURE — 63600175 PHARM REV CODE 636 W HCPCS: Performed by: INTERNAL MEDICINE

## 2025-08-16 PROCEDURE — 83735 ASSAY OF MAGNESIUM: CPT | Performed by: INTERNAL MEDICINE

## 2025-08-16 PROCEDURE — 94760 N-INVAS EAR/PLS OXIMETRY 1: CPT

## 2025-08-16 PROCEDURE — 99900035 HC TECH TIME PER 15 MIN (STAT)

## 2025-08-16 PROCEDURE — 94799 UNLISTED PULMONARY SVC/PX: CPT

## 2025-08-16 PROCEDURE — 80053 COMPREHEN METABOLIC PANEL: CPT | Performed by: PHYSICIAN ASSISTANT

## 2025-08-16 PROCEDURE — 21400001 HC TELEMETRY ROOM

## 2025-08-16 PROCEDURE — 85025 COMPLETE CBC W/AUTO DIFF WBC: CPT | Performed by: PHYSICIAN ASSISTANT

## 2025-08-16 PROCEDURE — 36415 COLL VENOUS BLD VENIPUNCTURE: CPT | Performed by: PHYSICIAN ASSISTANT

## 2025-08-16 RX ORDER — METHOCARBAMOL 500 MG/1
500 TABLET, FILM COATED ORAL 3 TIMES DAILY
Status: COMPLETED | OUTPATIENT
Start: 2025-08-16 | End: 2025-08-17

## 2025-08-16 RX ORDER — POLYETHYLENE GLYCOL 3350 17 G/17G
17 POWDER, FOR SOLUTION ORAL DAILY
Status: DISCONTINUED | OUTPATIENT
Start: 2025-08-16 | End: 2025-08-20 | Stop reason: HOSPADM

## 2025-08-16 RX ORDER — AMOXICILLIN 250 MG
2 CAPSULE ORAL 2 TIMES DAILY
Status: DISCONTINUED | OUTPATIENT
Start: 2025-08-16 | End: 2025-08-20 | Stop reason: HOSPADM

## 2025-08-16 RX ADMIN — SENNOSIDES, DOCUSATE SODIUM 2 TABLET: 50; 8.6 TABLET, FILM COATED ORAL at 11:08

## 2025-08-16 RX ADMIN — CEFAZOLIN 2 G: 2 INJECTION, POWDER, FOR SOLUTION INTRAMUSCULAR; INTRAVENOUS at 02:08

## 2025-08-16 RX ADMIN — METOPROLOL SUCCINATE 50 MG: 50 TABLET, EXTENDED RELEASE ORAL at 09:08

## 2025-08-16 RX ADMIN — LEVETIRACETAM 500 MG: 500 TABLET, FILM COATED ORAL at 09:08

## 2025-08-16 RX ADMIN — OXYCODONE HYDROCHLORIDE 10 MG: 10 TABLET ORAL at 09:08

## 2025-08-16 RX ADMIN — OXYCODONE HYDROCHLORIDE 10 MG: 10 TABLET ORAL at 08:08

## 2025-08-16 RX ADMIN — METHOCARBAMOL 500 MG: 500 TABLET ORAL at 08:08

## 2025-08-16 RX ADMIN — ATORVASTATIN CALCIUM 40 MG: 40 TABLET, FILM COATED ORAL at 08:08

## 2025-08-16 RX ADMIN — OXYCODONE HYDROCHLORIDE 10 MG: 10 TABLET ORAL at 02:08

## 2025-08-16 RX ADMIN — PANTOPRAZOLE SODIUM 40 MG: 40 TABLET, DELAYED RELEASE ORAL at 05:08

## 2025-08-16 RX ADMIN — POLYETHYLENE GLYCOL 3350 17 G: 17 POWDER, FOR SOLUTION ORAL at 11:08

## 2025-08-16 RX ADMIN — CLOPIDOGREL BISULFATE 75 MG: 75 TABLET, FILM COATED ORAL at 09:08

## 2025-08-16 RX ADMIN — SENNOSIDES, DOCUSATE SODIUM 2 TABLET: 50; 8.6 TABLET, FILM COATED ORAL at 08:08

## 2025-08-16 RX ADMIN — LEVETIRACETAM 500 MG: 500 TABLET, FILM COATED ORAL at 08:08

## 2025-08-16 RX ADMIN — ENOXAPARIN SODIUM 40 MG: 40 INJECTION SUBCUTANEOUS at 04:08

## 2025-08-16 RX ADMIN — NIFEDIPINE 30 MG: 30 TABLET, FILM COATED, EXTENDED RELEASE ORAL at 09:08

## 2025-08-16 RX ADMIN — MORPHINE SULFATE 2 MG: 4 INJECTION, SOLUTION INTRAMUSCULAR; INTRAVENOUS at 05:08

## 2025-08-16 RX ADMIN — METHOCARBAMOL 500 MG: 500 TABLET ORAL at 04:08

## 2025-08-16 RX ADMIN — TAMSULOSIN HYDROCHLORIDE 0.4 MG: 0.4 CAPSULE ORAL at 09:08

## 2025-08-16 RX ADMIN — METHOCARBAMOL 500 MG: 500 TABLET ORAL at 11:08

## 2025-08-16 RX ADMIN — VENLAFAXINE HYDROCHLORIDE 75 MG: 37.5 CAPSULE, EXTENDED RELEASE ORAL at 09:08

## 2025-08-16 RX ADMIN — LOSARTAN POTASSIUM 100 MG: 50 TABLET, FILM COATED ORAL at 09:08

## 2025-08-17 LAB
ALBUMIN SERPL-MCNC: 2.8 G/DL (ref 3.4–4.8)
ALBUMIN/GLOB SERPL: 0.7 RATIO (ref 1.1–2)
ALP SERPL-CCNC: 70 UNIT/L (ref 40–150)
ALT SERPL-CCNC: 8 UNIT/L (ref 0–55)
ANION GAP SERPL CALC-SCNC: 8 MEQ/L
AST SERPL-CCNC: 11 UNIT/L (ref 11–45)
BASOPHILS # BLD AUTO: 0.05 X10(3)/MCL
BASOPHILS NFR BLD AUTO: 0.6 %
BILIRUB SERPL-MCNC: 0.4 MG/DL
BUN SERPL-MCNC: 14.2 MG/DL (ref 8.4–25.7)
CALCIUM SERPL-MCNC: 8.9 MG/DL (ref 8.8–10)
CHLORIDE SERPL-SCNC: 98 MMOL/L (ref 98–107)
CO2 SERPL-SCNC: 28 MMOL/L (ref 23–31)
CREAT SERPL-MCNC: 0.82 MG/DL (ref 0.72–1.25)
CREAT/UREA NIT SERPL: 17
EOSINOPHIL # BLD AUTO: 0.18 X10(3)/MCL (ref 0–0.9)
EOSINOPHIL NFR BLD AUTO: 2.2 %
ERYTHROCYTE [DISTWIDTH] IN BLOOD BY AUTOMATED COUNT: 15.3 % (ref 11.5–17)
GFR SERPLBLD CREATININE-BSD FMLA CKD-EPI: >60 ML/MIN/1.73/M2
GLOBULIN SER-MCNC: 4.2 GM/DL (ref 2.4–3.5)
GLUCOSE SERPL-MCNC: 107 MG/DL (ref 82–115)
HCT VFR BLD AUTO: 41.6 % (ref 42–52)
HGB BLD-MCNC: 13.3 G/DL (ref 14–18)
IMM GRANULOCYTES # BLD AUTO: 0.03 X10(3)/MCL (ref 0–0.04)
IMM GRANULOCYTES NFR BLD AUTO: 0.4 %
LYMPHOCYTES # BLD AUTO: 1.36 X10(3)/MCL (ref 0.6–4.6)
LYMPHOCYTES NFR BLD AUTO: 16.6 %
MCH RBC QN AUTO: 28.7 PG (ref 27–31)
MCHC RBC AUTO-ENTMCNC: 32 G/DL (ref 33–36)
MCV RBC AUTO: 89.8 FL (ref 80–94)
MONOCYTES # BLD AUTO: 0.93 X10(3)/MCL (ref 0.1–1.3)
MONOCYTES NFR BLD AUTO: 11.4 %
NEUTROPHILS # BLD AUTO: 5.63 X10(3)/MCL (ref 2.1–9.2)
NEUTROPHILS NFR BLD AUTO: 68.8 %
NRBC BLD AUTO-RTO: 0 %
PLATELET # BLD AUTO: 297 X10(3)/MCL (ref 130–400)
PMV BLD AUTO: 9.5 FL (ref 7.4–10.4)
POTASSIUM SERPL-SCNC: 4.3 MMOL/L (ref 3.5–5.1)
PROT SERPL-MCNC: 7 GM/DL (ref 5.8–7.6)
RBC # BLD AUTO: 4.63 X10(6)/MCL (ref 4.7–6.1)
SODIUM SERPL-SCNC: 134 MMOL/L (ref 136–145)
WBC # BLD AUTO: 8.18 X10(3)/MCL (ref 4.5–11.5)

## 2025-08-17 PROCEDURE — 36415 COLL VENOUS BLD VENIPUNCTURE: CPT | Performed by: PHYSICIAN ASSISTANT

## 2025-08-17 PROCEDURE — 25000003 PHARM REV CODE 250

## 2025-08-17 PROCEDURE — 27100171 HC OXYGEN HIGH FLOW UP TO 24 HOURS

## 2025-08-17 PROCEDURE — 25000003 PHARM REV CODE 250: Performed by: INTERNAL MEDICINE

## 2025-08-17 PROCEDURE — 21400001 HC TELEMETRY ROOM

## 2025-08-17 PROCEDURE — 99900031 HC PATIENT EDUCATION (STAT)

## 2025-08-17 PROCEDURE — 94660 CPAP INITIATION&MGMT: CPT

## 2025-08-17 PROCEDURE — 99900035 HC TECH TIME PER 15 MIN (STAT)

## 2025-08-17 PROCEDURE — 63600175 PHARM REV CODE 636 W HCPCS: Performed by: INTERNAL MEDICINE

## 2025-08-17 PROCEDURE — 63600175 PHARM REV CODE 636 W HCPCS: Performed by: PHYSICIAN ASSISTANT

## 2025-08-17 PROCEDURE — 97530 THERAPEUTIC ACTIVITIES: CPT | Mod: CQ

## 2025-08-17 PROCEDURE — 25000003 PHARM REV CODE 250: Performed by: PHYSICIAN ASSISTANT

## 2025-08-17 PROCEDURE — 94760 N-INVAS EAR/PLS OXIMETRY 1: CPT

## 2025-08-17 PROCEDURE — 80053 COMPREHEN METABOLIC PANEL: CPT | Performed by: PHYSICIAN ASSISTANT

## 2025-08-17 PROCEDURE — 85025 COMPLETE CBC W/AUTO DIFF WBC: CPT | Performed by: PHYSICIAN ASSISTANT

## 2025-08-17 RX ADMIN — LEVETIRACETAM 500 MG: 500 TABLET, FILM COATED ORAL at 08:08

## 2025-08-17 RX ADMIN — ATORVASTATIN CALCIUM 40 MG: 40 TABLET, FILM COATED ORAL at 08:08

## 2025-08-17 RX ADMIN — VENLAFAXINE HYDROCHLORIDE 75 MG: 37.5 CAPSULE, EXTENDED RELEASE ORAL at 09:08

## 2025-08-17 RX ADMIN — OXYCODONE HYDROCHLORIDE 10 MG: 10 TABLET ORAL at 05:08

## 2025-08-17 RX ADMIN — CLOPIDOGREL BISULFATE 75 MG: 75 TABLET, FILM COATED ORAL at 09:08

## 2025-08-17 RX ADMIN — METHOCARBAMOL 500 MG: 500 TABLET ORAL at 09:08

## 2025-08-17 RX ADMIN — METHOCARBAMOL 500 MG: 500 TABLET ORAL at 02:08

## 2025-08-17 RX ADMIN — ENOXAPARIN SODIUM 40 MG: 40 INJECTION SUBCUTANEOUS at 05:08

## 2025-08-17 RX ADMIN — TAMSULOSIN HYDROCHLORIDE 0.4 MG: 0.4 CAPSULE ORAL at 09:08

## 2025-08-17 RX ADMIN — SENNOSIDES, DOCUSATE SODIUM 2 TABLET: 50; 8.6 TABLET, FILM COATED ORAL at 09:08

## 2025-08-17 RX ADMIN — OXYCODONE HYDROCHLORIDE 10 MG: 10 TABLET ORAL at 11:08

## 2025-08-17 RX ADMIN — METOPROLOL SUCCINATE 50 MG: 50 TABLET, EXTENDED RELEASE ORAL at 09:08

## 2025-08-17 RX ADMIN — PANTOPRAZOLE SODIUM 40 MG: 40 TABLET, DELAYED RELEASE ORAL at 05:08

## 2025-08-17 RX ADMIN — TIZANIDINE 4 MG: 4 TABLET ORAL at 06:08

## 2025-08-17 RX ADMIN — LOSARTAN POTASSIUM 100 MG: 50 TABLET, FILM COATED ORAL at 09:08

## 2025-08-17 RX ADMIN — POLYETHYLENE GLYCOL 3350 17 G: 17 POWDER, FOR SOLUTION ORAL at 09:08

## 2025-08-17 RX ADMIN — MORPHINE SULFATE 2 MG: 4 INJECTION, SOLUTION INTRAMUSCULAR; INTRAVENOUS at 03:08

## 2025-08-17 RX ADMIN — LEVETIRACETAM 500 MG: 500 TABLET, FILM COATED ORAL at 09:08

## 2025-08-17 RX ADMIN — NIFEDIPINE 30 MG: 30 TABLET, FILM COATED, EXTENDED RELEASE ORAL at 09:08

## 2025-08-17 RX ADMIN — OXYCODONE HYDROCHLORIDE 10 MG: 10 TABLET ORAL at 06:08

## 2025-08-17 RX ADMIN — SENNOSIDES, DOCUSATE SODIUM 2 TABLET: 50; 8.6 TABLET, FILM COATED ORAL at 08:08

## 2025-08-17 RX ADMIN — METHOCARBAMOL 500 MG: 500 TABLET ORAL at 08:08

## 2025-08-18 LAB
ALBUMIN SERPL-MCNC: 2.6 G/DL (ref 3.4–4.8)
ALBUMIN/GLOB SERPL: 0.6 RATIO (ref 1.1–2)
ALP SERPL-CCNC: 69 UNIT/L (ref 40–150)
ALT SERPL-CCNC: 8 UNIT/L (ref 0–55)
ANION GAP SERPL CALC-SCNC: 5 MEQ/L
AST SERPL-CCNC: 9 UNIT/L (ref 11–45)
BASOPHILS # BLD AUTO: 0.04 X10(3)/MCL
BASOPHILS NFR BLD AUTO: 0.5 %
BILIRUB SERPL-MCNC: 0.4 MG/DL
BUN SERPL-MCNC: 17.7 MG/DL (ref 8.4–25.7)
CALCIUM SERPL-MCNC: 9.1 MG/DL (ref 8.8–10)
CHLORIDE SERPL-SCNC: 98 MMOL/L (ref 98–107)
CO2 SERPL-SCNC: 31 MMOL/L (ref 23–31)
CREAT SERPL-MCNC: 0.93 MG/DL (ref 0.72–1.25)
CREAT/UREA NIT SERPL: 19
EOSINOPHIL # BLD AUTO: 0.25 X10(3)/MCL (ref 0–0.9)
EOSINOPHIL NFR BLD AUTO: 3 %
ERYTHROCYTE [DISTWIDTH] IN BLOOD BY AUTOMATED COUNT: 15.1 % (ref 11.5–17)
GFR SERPLBLD CREATININE-BSD FMLA CKD-EPI: >60 ML/MIN/1.73/M2
GLOBULIN SER-MCNC: 4.3 GM/DL (ref 2.4–3.5)
GLUCOSE SERPL-MCNC: 105 MG/DL (ref 82–115)
HCT VFR BLD AUTO: 38.8 % (ref 42–52)
HGB BLD-MCNC: 12.5 G/DL (ref 14–18)
IMM GRANULOCYTES # BLD AUTO: 0.02 X10(3)/MCL (ref 0–0.04)
IMM GRANULOCYTES NFR BLD AUTO: 0.2 %
LYMPHOCYTES # BLD AUTO: 1.42 X10(3)/MCL (ref 0.6–4.6)
LYMPHOCYTES NFR BLD AUTO: 17.2 %
MCH RBC QN AUTO: 28.7 PG (ref 27–31)
MCHC RBC AUTO-ENTMCNC: 32.2 G/DL (ref 33–36)
MCV RBC AUTO: 89.2 FL (ref 80–94)
MONOCYTES # BLD AUTO: 1.22 X10(3)/MCL (ref 0.1–1.3)
MONOCYTES NFR BLD AUTO: 14.8 %
NEUTROPHILS # BLD AUTO: 5.31 X10(3)/MCL (ref 2.1–9.2)
NEUTROPHILS NFR BLD AUTO: 64.3 %
NRBC BLD AUTO-RTO: 0 %
PLATELET # BLD AUTO: 308 X10(3)/MCL (ref 130–400)
PMV BLD AUTO: 9 FL (ref 7.4–10.4)
POTASSIUM SERPL-SCNC: 4.5 MMOL/L (ref 3.5–5.1)
PROT SERPL-MCNC: 6.9 GM/DL (ref 5.8–7.6)
RBC # BLD AUTO: 4.35 X10(6)/MCL (ref 4.7–6.1)
SODIUM SERPL-SCNC: 134 MMOL/L (ref 136–145)
WBC # BLD AUTO: 8.26 X10(3)/MCL (ref 4.5–11.5)

## 2025-08-18 PROCEDURE — 97535 SELF CARE MNGMENT TRAINING: CPT

## 2025-08-18 PROCEDURE — 99900035 HC TECH TIME PER 15 MIN (STAT)

## 2025-08-18 PROCEDURE — 80053 COMPREHEN METABOLIC PANEL: CPT | Performed by: PHYSICIAN ASSISTANT

## 2025-08-18 PROCEDURE — 21400001 HC TELEMETRY ROOM

## 2025-08-18 PROCEDURE — 99900031 HC PATIENT EDUCATION (STAT)

## 2025-08-18 PROCEDURE — 94660 CPAP INITIATION&MGMT: CPT

## 2025-08-18 PROCEDURE — 25000003 PHARM REV CODE 250: Performed by: INTERNAL MEDICINE

## 2025-08-18 PROCEDURE — 94799 UNLISTED PULMONARY SVC/PX: CPT

## 2025-08-18 PROCEDURE — 27100171 HC OXYGEN HIGH FLOW UP TO 24 HOURS

## 2025-08-18 PROCEDURE — 25000003 PHARM REV CODE 250: Performed by: PHYSICIAN ASSISTANT

## 2025-08-18 PROCEDURE — 25000003 PHARM REV CODE 250

## 2025-08-18 PROCEDURE — 97110 THERAPEUTIC EXERCISES: CPT

## 2025-08-18 PROCEDURE — 97116 GAIT TRAINING THERAPY: CPT | Mod: CQ

## 2025-08-18 PROCEDURE — 85025 COMPLETE CBC W/AUTO DIFF WBC: CPT | Performed by: PHYSICIAN ASSISTANT

## 2025-08-18 PROCEDURE — 63600175 PHARM REV CODE 636 W HCPCS: Performed by: PHYSICIAN ASSISTANT

## 2025-08-18 PROCEDURE — 94760 N-INVAS EAR/PLS OXIMETRY 1: CPT

## 2025-08-18 PROCEDURE — 36415 COLL VENOUS BLD VENIPUNCTURE: CPT | Performed by: PHYSICIAN ASSISTANT

## 2025-08-18 RX ORDER — OXYCODONE HYDROCHLORIDE 10 MG/1
10 TABLET ORAL EVERY 4 HOURS PRN
Refills: 0 | Status: DISCONTINUED | OUTPATIENT
Start: 2025-08-18 | End: 2025-08-20 | Stop reason: HOSPADM

## 2025-08-18 RX ORDER — OXYCODONE HYDROCHLORIDE 5 MG/1
5 TABLET ORAL EVERY 6 HOURS PRN
Refills: 0 | Status: DISCONTINUED | OUTPATIENT
Start: 2025-08-18 | End: 2025-08-20 | Stop reason: HOSPADM

## 2025-08-18 RX ORDER — MORPHINE SULFATE 4 MG/ML
2 INJECTION, SOLUTION INTRAMUSCULAR; INTRAVENOUS EVERY 4 HOURS PRN
Status: DISCONTINUED | OUTPATIENT
Start: 2025-08-18 | End: 2025-08-20 | Stop reason: HOSPADM

## 2025-08-18 RX ADMIN — SENNOSIDES, DOCUSATE SODIUM 2 TABLET: 50; 8.6 TABLET, FILM COATED ORAL at 09:08

## 2025-08-18 RX ADMIN — OXYCODONE HYDROCHLORIDE 10 MG: 10 TABLET ORAL at 03:08

## 2025-08-18 RX ADMIN — LEVETIRACETAM 500 MG: 500 TABLET, FILM COATED ORAL at 09:08

## 2025-08-18 RX ADMIN — ENOXAPARIN SODIUM 40 MG: 40 INJECTION SUBCUTANEOUS at 04:08

## 2025-08-18 RX ADMIN — CLOPIDOGREL BISULFATE 75 MG: 75 TABLET, FILM COATED ORAL at 09:08

## 2025-08-18 RX ADMIN — NIFEDIPINE 30 MG: 30 TABLET, FILM COATED, EXTENDED RELEASE ORAL at 09:08

## 2025-08-18 RX ADMIN — METOPROLOL SUCCINATE 50 MG: 50 TABLET, EXTENDED RELEASE ORAL at 09:08

## 2025-08-18 RX ADMIN — POLYETHYLENE GLYCOL 3350 17 G: 17 POWDER, FOR SOLUTION ORAL at 09:08

## 2025-08-18 RX ADMIN — VENLAFAXINE HYDROCHLORIDE 75 MG: 37.5 CAPSULE, EXTENDED RELEASE ORAL at 09:08

## 2025-08-18 RX ADMIN — TIZANIDINE 4 MG: 4 TABLET ORAL at 03:08

## 2025-08-18 RX ADMIN — PANTOPRAZOLE SODIUM 40 MG: 40 TABLET, DELAYED RELEASE ORAL at 03:08

## 2025-08-18 RX ADMIN — OXYCODONE HYDROCHLORIDE 10 MG: 10 TABLET ORAL at 04:08

## 2025-08-18 RX ADMIN — ATORVASTATIN CALCIUM 40 MG: 40 TABLET, FILM COATED ORAL at 09:08

## 2025-08-18 RX ADMIN — OXYCODONE HYDROCHLORIDE 10 MG: 10 TABLET ORAL at 09:08

## 2025-08-18 RX ADMIN — TAMSULOSIN HYDROCHLORIDE 0.4 MG: 0.4 CAPSULE ORAL at 09:08

## 2025-08-18 RX ADMIN — LOSARTAN POTASSIUM 100 MG: 50 TABLET, FILM COATED ORAL at 09:08

## 2025-08-18 RX ADMIN — ACETAMINOPHEN 650 MG: 325 TABLET ORAL at 09:08

## 2025-08-19 PROCEDURE — 25000003 PHARM REV CODE 250: Performed by: ANESTHESIOLOGY

## 2025-08-19 PROCEDURE — 25000003 PHARM REV CODE 250: Performed by: PHYSICIAN ASSISTANT

## 2025-08-19 PROCEDURE — 97110 THERAPEUTIC EXERCISES: CPT | Mod: CQ

## 2025-08-19 PROCEDURE — 21400001 HC TELEMETRY ROOM

## 2025-08-19 PROCEDURE — 63600175 PHARM REV CODE 636 W HCPCS: Performed by: PHYSICIAN ASSISTANT

## 2025-08-19 PROCEDURE — 97535 SELF CARE MNGMENT TRAINING: CPT | Mod: CO

## 2025-08-19 PROCEDURE — 97116 GAIT TRAINING THERAPY: CPT | Mod: CQ

## 2025-08-19 PROCEDURE — 25000003 PHARM REV CODE 250: Performed by: INTERNAL MEDICINE

## 2025-08-19 PROCEDURE — 99900035 HC TECH TIME PER 15 MIN (STAT)

## 2025-08-19 PROCEDURE — 94760 N-INVAS EAR/PLS OXIMETRY 1: CPT

## 2025-08-19 PROCEDURE — 25000003 PHARM REV CODE 250

## 2025-08-19 PROCEDURE — 97530 THERAPEUTIC ACTIVITIES: CPT | Mod: CO

## 2025-08-19 PROCEDURE — 99900031 HC PATIENT EDUCATION (STAT)

## 2025-08-19 PROCEDURE — 63600175 PHARM REV CODE 636 W HCPCS: Performed by: INTERNAL MEDICINE

## 2025-08-19 PROCEDURE — 94799 UNLISTED PULMONARY SVC/PX: CPT | Mod: XB

## 2025-08-19 RX ORDER — VENLAFAXINE HYDROCHLORIDE 75 MG/1
75 CAPSULE, EXTENDED RELEASE ORAL DAILY
Qty: 30 CAPSULE | Refills: 11 | Status: ON HOLD | OUTPATIENT
Start: 2025-08-19 | End: 2026-08-19

## 2025-08-19 RX ORDER — CLOPIDOGREL BISULFATE 75 MG/1
75 TABLET ORAL DAILY
Qty: 90 TABLET | Refills: 3 | Status: ON HOLD | OUTPATIENT
Start: 2025-08-19 | End: 2026-08-19

## 2025-08-19 RX ORDER — PANTOPRAZOLE SODIUM 40 MG/1
40 TABLET, DELAYED RELEASE ORAL DAILY
Qty: 90 TABLET | Refills: 3 | Status: ON HOLD | OUTPATIENT
Start: 2025-08-19 | End: 2026-08-19

## 2025-08-19 RX ORDER — LEVETIRACETAM 500 MG/1
500 TABLET ORAL 2 TIMES DAILY
Qty: 180 TABLET | Refills: 3 | Status: ON HOLD | OUTPATIENT
Start: 2025-08-19 | End: 2026-08-19

## 2025-08-19 RX ADMIN — LEVETIRACETAM 500 MG: 500 TABLET, FILM COATED ORAL at 09:08

## 2025-08-19 RX ADMIN — PANTOPRAZOLE SODIUM 40 MG: 40 TABLET, DELAYED RELEASE ORAL at 05:08

## 2025-08-19 RX ADMIN — MORPHINE SULFATE 2 MG: 4 INJECTION, SOLUTION INTRAMUSCULAR; INTRAVENOUS at 04:08

## 2025-08-19 RX ADMIN — ACETAMINOPHEN 1000 MG: 500 TABLET ORAL at 05:08

## 2025-08-19 RX ADMIN — ATORVASTATIN CALCIUM 40 MG: 40 TABLET, FILM COATED ORAL at 09:08

## 2025-08-19 RX ADMIN — OXYCODONE HYDROCHLORIDE 5 MG: 5 TABLET ORAL at 07:08

## 2025-08-19 RX ADMIN — OXYCODONE HYDROCHLORIDE 10 MG: 10 TABLET ORAL at 09:08

## 2025-08-19 RX ADMIN — METOPROLOL SUCCINATE 50 MG: 50 TABLET, EXTENDED RELEASE ORAL at 09:08

## 2025-08-19 RX ADMIN — SENNOSIDES, DOCUSATE SODIUM 2 TABLET: 50; 8.6 TABLET, FILM COATED ORAL at 09:08

## 2025-08-19 RX ADMIN — ENOXAPARIN SODIUM 40 MG: 40 INJECTION SUBCUTANEOUS at 05:08

## 2025-08-19 RX ADMIN — VENLAFAXINE HYDROCHLORIDE 75 MG: 37.5 CAPSULE, EXTENDED RELEASE ORAL at 09:08

## 2025-08-19 RX ADMIN — LOSARTAN POTASSIUM 100 MG: 50 TABLET, FILM COATED ORAL at 09:08

## 2025-08-19 RX ADMIN — TIZANIDINE 4 MG: 4 TABLET ORAL at 05:08

## 2025-08-19 RX ADMIN — CLOPIDOGREL BISULFATE 75 MG: 75 TABLET, FILM COATED ORAL at 09:08

## 2025-08-19 RX ADMIN — TAMSULOSIN HYDROCHLORIDE 0.4 MG: 0.4 CAPSULE ORAL at 09:08

## 2025-08-19 RX ADMIN — OXYCODONE HYDROCHLORIDE 10 MG: 10 TABLET ORAL at 02:08

## 2025-08-19 RX ADMIN — POLYETHYLENE GLYCOL 3350 17 G: 17 POWDER, FOR SOLUTION ORAL at 09:08

## 2025-08-19 RX ADMIN — OXYCODONE HYDROCHLORIDE 10 MG: 10 TABLET ORAL at 05:08

## 2025-08-19 RX ADMIN — NIFEDIPINE 30 MG: 30 TABLET, FILM COATED, EXTENDED RELEASE ORAL at 09:08

## 2025-08-20 VITALS
WEIGHT: 260 LBS | BODY MASS INDEX: 36.4 KG/M2 | DIASTOLIC BLOOD PRESSURE: 80 MMHG | HEART RATE: 84 BPM | HEIGHT: 71 IN | RESPIRATION RATE: 18 BRPM | SYSTOLIC BLOOD PRESSURE: 116 MMHG | OXYGEN SATURATION: 95 % | TEMPERATURE: 99 F

## 2025-08-20 PROBLEM — S82.841D: Status: ACTIVE | Noted: 2025-08-20

## 2025-08-20 PROBLEM — S82.841A BIMALLEOLAR FRACTURE OF RIGHT ANKLE: Status: ACTIVE | Noted: 2025-08-20

## 2025-08-20 PROCEDURE — 25000003 PHARM REV CODE 250

## 2025-08-20 PROCEDURE — 99900035 HC TECH TIME PER 15 MIN (STAT)

## 2025-08-20 PROCEDURE — 27100171 HC OXYGEN HIGH FLOW UP TO 24 HOURS

## 2025-08-20 PROCEDURE — 97164 PT RE-EVAL EST PLAN CARE: CPT

## 2025-08-20 PROCEDURE — 94760 N-INVAS EAR/PLS OXIMETRY 1: CPT

## 2025-08-20 PROCEDURE — 97535 SELF CARE MNGMENT TRAINING: CPT | Mod: CO

## 2025-08-20 PROCEDURE — 94660 CPAP INITIATION&MGMT: CPT

## 2025-08-20 PROCEDURE — 25000003 PHARM REV CODE 250: Performed by: INTERNAL MEDICINE

## 2025-08-20 PROCEDURE — 99900031 HC PATIENT EDUCATION (STAT)

## 2025-08-20 RX ORDER — TAMSULOSIN HYDROCHLORIDE 0.4 MG/1
0.4 CAPSULE ORAL DAILY
OUTPATIENT
Start: 2025-08-21

## 2025-08-20 RX ORDER — TIZANIDINE 4 MG/1
4 TABLET ORAL EVERY 8 HOURS PRN
OUTPATIENT
Start: 2025-08-20

## 2025-08-20 RX ORDER — LOSARTAN POTASSIUM 50 MG/1
100 TABLET ORAL DAILY
OUTPATIENT
Start: 2025-08-21

## 2025-08-20 RX ORDER — OXYCODONE HYDROCHLORIDE 5 MG/1
5 TABLET ORAL EVERY 6 HOURS PRN
Refills: 0 | OUTPATIENT
Start: 2025-08-20

## 2025-08-20 RX ORDER — LEVETIRACETAM 500 MG/1
500 TABLET ORAL 2 TIMES DAILY
OUTPATIENT
Start: 2025-08-20

## 2025-08-20 RX ORDER — METOPROLOL SUCCINATE 50 MG/1
50 TABLET, EXTENDED RELEASE ORAL DAILY
OUTPATIENT
Start: 2025-08-21

## 2025-08-20 RX ORDER — PANTOPRAZOLE SODIUM 40 MG/1
40 TABLET, DELAYED RELEASE ORAL DAILY
OUTPATIENT
Start: 2025-08-21

## 2025-08-20 RX ORDER — IBUPROFEN 200 MG
16 TABLET ORAL
OUTPATIENT
Start: 2025-08-20

## 2025-08-20 RX ORDER — MORPHINE SULFATE 4 MG/ML
2 INJECTION, SOLUTION INTRAMUSCULAR; INTRAVENOUS EVERY 4 HOURS PRN
Refills: 0 | OUTPATIENT
Start: 2025-08-20

## 2025-08-20 RX ORDER — VENLAFAXINE HYDROCHLORIDE 37.5 MG/1
75 CAPSULE, EXTENDED RELEASE ORAL DAILY
OUTPATIENT
Start: 2025-08-21

## 2025-08-20 RX ORDER — ONDANSETRON HYDROCHLORIDE 2 MG/ML
4 INJECTION, SOLUTION INTRAVENOUS EVERY 6 HOURS PRN
OUTPATIENT
Start: 2025-08-20

## 2025-08-20 RX ORDER — NIFEDIPINE 30 MG/1
30 TABLET, EXTENDED RELEASE ORAL DAILY
OUTPATIENT
Start: 2025-08-21

## 2025-08-20 RX ORDER — POLYETHYLENE GLYCOL 3350 17 G/17G
17 POWDER, FOR SOLUTION ORAL DAILY
OUTPATIENT
Start: 2025-08-21

## 2025-08-20 RX ORDER — NALOXONE HCL 0.4 MG/ML
0.02 VIAL (ML) INJECTION
OUTPATIENT
Start: 2025-08-20

## 2025-08-20 RX ORDER — ATORVASTATIN CALCIUM 40 MG/1
40 TABLET, FILM COATED ORAL NIGHTLY
OUTPATIENT
Start: 2025-08-20

## 2025-08-20 RX ORDER — ACETAMINOPHEN 500 MG
1000 TABLET ORAL
OUTPATIENT
Start: 2025-08-20

## 2025-08-20 RX ORDER — HYDRALAZINE HYDROCHLORIDE 20 MG/ML
10 INJECTION INTRAMUSCULAR; INTRAVENOUS EVERY 4 HOURS PRN
OUTPATIENT
Start: 2025-08-20

## 2025-08-20 RX ORDER — CLOPIDOGREL BISULFATE 75 MG/1
75 TABLET ORAL DAILY
OUTPATIENT
Start: 2025-08-21

## 2025-08-20 RX ORDER — OXYCODONE HYDROCHLORIDE 10 MG/1
10 TABLET ORAL EVERY 4 HOURS PRN
Refills: 0 | OUTPATIENT
Start: 2025-08-20

## 2025-08-20 RX ORDER — IBUPROFEN 200 MG
24 TABLET ORAL
OUTPATIENT
Start: 2025-08-20

## 2025-08-20 RX ORDER — SODIUM CHLORIDE 0.9 % (FLUSH) 0.9 %
10 SYRINGE (ML) INJECTION EVERY 12 HOURS PRN
OUTPATIENT
Start: 2025-08-20

## 2025-08-20 RX ORDER — GLUCAGON 1 MG
1 KIT INJECTION
OUTPATIENT
Start: 2025-08-20

## 2025-08-20 RX ORDER — ENOXAPARIN SODIUM 100 MG/ML
40 INJECTION SUBCUTANEOUS EVERY 24 HOURS
OUTPATIENT
Start: 2025-08-20

## 2025-08-20 RX ORDER — AMOXICILLIN 250 MG
2 CAPSULE ORAL 2 TIMES DAILY
OUTPATIENT
Start: 2025-08-20

## 2025-08-20 RX ADMIN — OXYCODONE HYDROCHLORIDE 10 MG: 10 TABLET ORAL at 07:08

## 2025-08-20 RX ADMIN — LEVETIRACETAM 500 MG: 500 TABLET, FILM COATED ORAL at 08:08

## 2025-08-20 RX ADMIN — SENNOSIDES, DOCUSATE SODIUM 2 TABLET: 50; 8.6 TABLET, FILM COATED ORAL at 08:08

## 2025-08-20 RX ADMIN — OXYCODONE HYDROCHLORIDE 10 MG: 10 TABLET ORAL at 03:08

## 2025-08-20 RX ADMIN — PANTOPRAZOLE SODIUM 40 MG: 40 TABLET, DELAYED RELEASE ORAL at 05:08

## 2025-08-20 RX ADMIN — POLYETHYLENE GLYCOL 3350 17 G: 17 POWDER, FOR SOLUTION ORAL at 08:08

## 2025-08-20 RX ADMIN — TAMSULOSIN HYDROCHLORIDE 0.4 MG: 0.4 CAPSULE ORAL at 08:08

## 2025-08-20 RX ADMIN — LOSARTAN POTASSIUM 100 MG: 50 TABLET, FILM COATED ORAL at 08:08

## 2025-08-20 RX ADMIN — NIFEDIPINE 30 MG: 30 TABLET, FILM COATED, EXTENDED RELEASE ORAL at 08:08

## 2025-08-20 RX ADMIN — OXYCODONE HYDROCHLORIDE 10 MG: 10 TABLET ORAL at 12:08

## 2025-08-20 RX ADMIN — METOPROLOL SUCCINATE 50 MG: 50 TABLET, EXTENDED RELEASE ORAL at 08:08

## 2025-08-20 RX ADMIN — CLOPIDOGREL BISULFATE 75 MG: 75 TABLET, FILM COATED ORAL at 08:08

## 2025-08-20 RX ADMIN — VENLAFAXINE HYDROCHLORIDE 75 MG: 37.5 CAPSULE, EXTENDED RELEASE ORAL at 08:08

## (undated) DEVICE — SUT VICRYL 2-0 36 CT-1

## (undated) DEVICE — SEE MEDLINE ITEM 146298

## (undated) DEVICE — STOCKINETTE IMPERVIOUS 16X54IN

## (undated) DEVICE — SUT ETHILON 2-0 FSLX 30 BLK

## (undated) DEVICE — Device

## (undated) DEVICE — BUCKET PLASTER DISPOSABLE

## (undated) DEVICE — SPLINT FIBERGLASS PAD 4X15

## (undated) DEVICE — DRAPE C-ARMOR EQUIPMENT COVER

## (undated) DEVICE — KIT SURGICAL TURNOVER

## (undated) DEVICE — BANDAGE COMPR VELCLOSE 4INX5YD

## (undated) DEVICE — PAD ABDOMINAL STERILE 8X10IN

## (undated) DEVICE — BANDAGE VELCLOSE ELAS 6INX5YD

## (undated) DEVICE — GLOVE SURG PLYSPHRN ORTH SZ 8

## (undated) DEVICE — COVER FULLGUARD SHOE HIGH-TOP

## (undated) DEVICE — GOWN SMARTSLEEVE AAMI LVL4 XL

## (undated) DEVICE — SOL NACL IRR 1000ML BTL

## (undated) DEVICE — SUT BONE WAX 2.5 GRMS 12/BX

## (undated) DEVICE — PADDING WYTEX UNDRCST 4INX4YD

## (undated) DEVICE — SUT MCRYL PLUS 2-0 CT-1 36IN

## (undated) DEVICE — CUFF ATS 2 PORT SNGL BLDR 34IN

## (undated) DEVICE — DRESSING N ADH OIL EMUL 3X8 3S

## (undated) DEVICE — TAPE SILK 3IN

## (undated) DEVICE — ELECTRODE REM POLYHESIVE II

## (undated) DEVICE — DRAPE STERI U-SHAPED 47X51IN

## (undated) DEVICE — GLOVE SIGNATURE MICRO LTX 6.5

## (undated) DEVICE — BANDAGE COMPR 6IN HOOK 6INX5YD

## (undated) DEVICE — GLOVE PROTEXIS BLUE LATEX 8

## (undated) DEVICE — ELECTRODE PATIENT RETURN DISP

## (undated) DEVICE — PADDING WYTEX UNDRCST 6INX4YD

## (undated) DEVICE — SUT 3-0 ETHILON 18 FS-1

## (undated) DEVICE — KIT INSTRUMENT FIBULOCK

## (undated) DEVICE — GLOVE PROTEXIS LTX MICRO 8

## (undated) DEVICE — COVER C-ARM STRAP BAND 44X80IN

## (undated) DEVICE — DRAPE INCISE IOBAN 2 23X23IN

## (undated) DEVICE — APPLICATOR CHLORAPREP ORN 26ML

## (undated) DEVICE — SPONGE COTTON TRAY 4X4IN

## (undated) DEVICE — GLOVE SENSICARE PI GRN 6.5

## (undated) DEVICE — DRESSING GAUZE XEROFORM 5X9